# Patient Record
Sex: FEMALE | Race: WHITE | NOT HISPANIC OR LATINO | Employment: UNEMPLOYED | URBAN - METROPOLITAN AREA
[De-identification: names, ages, dates, MRNs, and addresses within clinical notes are randomized per-mention and may not be internally consistent; named-entity substitution may affect disease eponyms.]

---

## 2018-03-12 ENCOUNTER — CONVERSION ENCOUNTER (OUTPATIENT)
Dept: SURGERY | Facility: CLINIC | Age: 68
End: 2018-03-12

## 2018-03-12 ENCOUNTER — OFFICE VISIT CONVERTED (OUTPATIENT)
Dept: SURGERY | Facility: CLINIC | Age: 68
End: 2018-03-12
Attending: UROLOGY

## 2018-03-26 ENCOUNTER — OFFICE VISIT CONVERTED (OUTPATIENT)
Dept: SURGERY | Facility: CLINIC | Age: 68
End: 2018-03-26
Attending: UROLOGY

## 2018-04-13 ENCOUNTER — OFFICE VISIT CONVERTED (OUTPATIENT)
Dept: SURGERY | Facility: CLINIC | Age: 68
End: 2018-04-13
Attending: UROLOGY

## 2018-05-25 ENCOUNTER — OFFICE VISIT CONVERTED (OUTPATIENT)
Dept: SURGERY | Facility: CLINIC | Age: 68
End: 2018-05-25
Attending: UROLOGY

## 2018-05-25 ENCOUNTER — CONVERSION ENCOUNTER (OUTPATIENT)
Dept: SURGERY | Facility: CLINIC | Age: 68
End: 2018-05-25

## 2018-12-04 ENCOUNTER — OFFICE VISIT CONVERTED (OUTPATIENT)
Dept: SURGERY | Facility: CLINIC | Age: 68
End: 2018-12-04
Attending: UROLOGY

## 2020-01-07 ENCOUNTER — HOSPITAL ENCOUNTER (OUTPATIENT)
Dept: CT IMAGING | Facility: HOSPITAL | Age: 70
Discharge: HOME | End: 2020-01-07

## 2020-01-07 LAB
CREAT BLD-MCNC: 1.1 MG/DL (ref 0.6–1.4)
GFR SERPLBLD BASED ON 1.73 SQ M-ARVRAT: 51 ML/MIN/{1.73_M2}

## 2020-01-31 ENCOUNTER — OFFICE VISIT CONVERTED (OUTPATIENT)
Dept: UROLOGY | Facility: CLINIC | Age: 70
End: 2020-01-31
Attending: SURGERY

## 2020-01-31 ENCOUNTER — CONVERSION ENCOUNTER (OUTPATIENT)
Dept: SURGERY | Facility: CLINIC | Age: 70
End: 2020-01-31

## 2021-03-04 ENCOUNTER — HOSPITAL ENCOUNTER (OUTPATIENT)
Dept: CT IMAGING | Facility: HOSPITAL | Age: 71
Discharge: HOME | End: 2021-03-04
Attending: UROLOGY

## 2021-03-04 LAB
CREAT BLD-MCNC: 1.3 MG/DL (ref 0.6–1.4)
GFR SERPLBLD BASED ON 1.73 SQ M-ARVRAT: 41 ML/MIN/{1.73_M2}

## 2021-04-12 ENCOUNTER — OFFICE VISIT CONVERTED (OUTPATIENT)
Dept: UROLOGY | Facility: CLINIC | Age: 71
End: 2021-04-12
Attending: UROLOGY

## 2021-04-12 ENCOUNTER — HOSPITAL ENCOUNTER (OUTPATIENT)
Dept: SURGERY | Facility: CLINIC | Age: 71
Discharge: HOME | End: 2021-04-12
Attending: UROLOGY

## 2021-04-12 ENCOUNTER — CONVERSION ENCOUNTER (OUTPATIENT)
Dept: SURGERY | Facility: CLINIC | Age: 71
End: 2021-04-12

## 2021-04-12 LAB
BILIRUB UR QL STRIP: NEGATIVE
COLOR UR: YELLOW
CONV CLARITY OF URINE: CLEAR
CONV PROTEIN IN URINE BY AUTOMATED TEST STRIP: NORMAL
CONV UROBILINOGEN IN URINE BY AUTOMATED TEST STRIP: NORMAL
GLUCOSE UR QL: NEGATIVE
HGB UR QL STRIP: NORMAL
KETONES UR QL STRIP: NEGATIVE
LEUKOCYTE ESTERASE UR QL STRIP: NORMAL
NITRITE UR QL STRIP: POSITIVE
PH UR STRIP.AUTO: 5.5 [PH]
SP GR UR: 1.03

## 2021-04-15 LAB
AMPICILLIN SUSC ISLT: <=2
AMPICILLIN+SULBAC SUSC ISLT: <=2
BACTERIA UR CULT: ABNORMAL
CEFAZOLIN SUSC ISLT: <=4
CEFEPIME SUSC ISLT: <=0.12
CEFTAZIDIME SUSC ISLT: <=1
CEFTRIAXONE SUSC ISLT: <=0.25
CIPROFLOXACIN SUSC ISLT: <=0.25
ERTAPENEM SUSC ISLT: <=0.12
GENTAMICIN SUSC ISLT: <=1
LEVOFLOXACIN SUSC ISLT: <=0.12
NITROFURANTOIN SUSC ISLT: 32
PIP+TAZO SUSC ISLT: <=4
TMP SMX SUSC ISLT: <=20
TOBRAMYCIN SUSC ISLT: <=1

## 2021-05-14 VITALS — BODY MASS INDEX: 32.39 KG/M2 | HEIGHT: 62 IN | WEIGHT: 176 LBS | RESPIRATION RATE: 16 BRPM

## 2021-05-14 NOTE — PROGRESS NOTES
Progress Note      Patient Name: Treva Chan   Patient ID: [id number]   Sex: Female   YOB: 1950    Primary Care Provider: Joe PALACIOS   Referring Provider: Robbie Contreras MD    Visit Date: April 12, 2021    Provider: Robibe Contreras MD   Location: [facility]   Location Address: [address] [place] [address]  [id number]   Location Phone: [phone]          Chief Complaint  · urological issues      History Of Present Illness     69 yo female presents in follow up after having robot assist lap right partial nx in May 2018.    3/4/2021 CT abdomen/pelvis with -negative for metastatic disease.  Negative for recurrence.  Moderate hiatal hernia.  Previous ventral hernia repair    4/21 creatinine 1.2, GFR 44  4/21 chest x-ray - neg    Doing well. No changes to her medical history.    Patient does deal with some frequency and urgency but this has been her baseline for many years.  No burning/dysuria currently, she is nitrite positive    NO GH/UTI    does have a right lateral Spigelian hernia has seen Gen surg.    Previous    1/20 CT abdomen/pelvis with and withoutno signs of metastatic or recurrent disease.  Previous partial nephrectomy site on the right kidney.    1/20 Cr  1.1, 51 GFR  1/18  CXR  neg    5/9/2018 lap right partial nephrectomy  Path:   clear cell RCC  G2 1.9 cm  margins negative  pT1a         Past Medical History  Anxiety; Fluttering heart; Hyperlipemia; Hypertension; Renal cell cancer, right; Stroke         Past Surgical History  Cholecstectomy; Hernia; Robotic-assisted partial right nephrectomy         Medication List  acetaminophen 500 mg oral tablet; amlodipine-valsartan-hcthiazid 5-160-12.5 mg oral tablet; Aspir-81 81 mg oral tablet,delayed release (DR/EC); atorvastatin 40 mg oral tablet; cetirizine 10 mg oral tablet; colestipol 1 gram oral tablet; lisinopril 20 mg oral tablet; meclizine 25 mg oral tablet; metoprolol succinate 50 mg oral  "tablet extended release 24 hr; Myrbetriq 50 mg oral tablet extended release 24 hr; ranitidine HCl 300 mg oral capsule; sertraline 50 mg oral tablet         Allergy List  Latex Exam Gloves; Levaquin; Seasonal allergies       Allergies Reconciled  Family Medical History  Lung cancer; Prostate cancer         Social History  Tobacco (Former)         Review of Systems  · Constitutional  o Denies  o : fatigue, fever, chills  · HENT  o Admits  o : vertigo, lightheadedness  o Denies  o : headaches  · Cardiovascular  o Denies  o : chest pain  · Respiratory  o Denies  o : shortness of breath  · Gastrointestinal  o Denies  o : nausea, vomiting, diarrhea      Vitals  Date Time BP Position Site L\R Cuff Size HR RR TEMP (F) WT  HT  BMI kg/m2 BSA m2 O2 Sat FR L/min FiO2 HC       04/12/2021 12:54 PM       16  175lbs 16oz 5'  2\" 32.19 1.87             Physical Examination  · Constitutional  o Appearance  o : Well-appearing, well-developed, in no acute distress  · Respiratory  o Respiratory Effort  o : Unlabored breathing  · Neurologic  o Mental Status Examination  o :   § Orientation  § : Grossly oriented to person, place and time, judgment and insight intact, normal mood and affect          Results  · In-Office Procedures  o Lab procedure  § IOP - Urinalysis without Microscopy (Clinitek) [facility] ([id number])   § Color Ur: Yellow   § Clarity Ur: Clear   § Glucose Ur Ql Strip: Negative   § Bilirub Ur Ql Strip: Negative   § Ketones Ur Ql Strip: Negative   § Sp Gr Ur Qn: 1.030   § Hgb Ur Ql Strip: Trace-Intact   § pH Ur-LsCnc: 5.5   § Prot Ur Ql Strip: Trace   § Urobilinogen Ur Strip-mCnc: 0.2 E.U./dL   § Nitrite Ur Ql Strip: Positive   § WBC Est Ur Ql Strip: Trace       Assessment  · Renal cell carcinoma of right kidney     189.0/C64.1  · UTI (urinary tract infection)     599.0/N39.0      Plan  · Orders  o Chest (AP PA and Lateral) 2 views X-Ray [facility] ([id number]) - 189.0/C64.1 - 04/12/2022  o CMP Non-fasting [facility] ([id number]) - 189.0/C64.1 " - 04/12/2022  o Urine Culture (Clean Catch) [facility] ([id number]) - 599.0/N39.0 - 04/12/2021  · Medications  o Medications have been Reconciled  o Transition of Care or Provider Policy  · Instructions  o Electronically Identified Patient Education Materials Provided Electronically         Urine culture today    Reviewed CT chest x-ray and labs, patient given reassurance      Per NCCN guidelines follow-up in 1 year with chest x-ray and CMP.  Patient needs this x1 more year after that and if negative neds no further follow-up.  No further abdominal imaging.             Electronically Signed by: Robbie Contreras MD -Author on April 12, 2021 04:42:03 PM

## 2021-05-15 VITALS — BODY MASS INDEX: 30.78 KG/M2 | RESPIRATION RATE: 12 BRPM | WEIGHT: 167.25 LBS | HEIGHT: 62 IN

## 2021-05-15 VITALS — WEIGHT: 165 LBS | BODY MASS INDEX: 30.36 KG/M2 | HEIGHT: 62 IN | RESPIRATION RATE: 16 BRPM

## 2021-05-15 VITALS — HEIGHT: 62 IN | RESPIRATION RATE: 17 BRPM | BODY MASS INDEX: 30.36 KG/M2 | WEIGHT: 165 LBS

## 2021-05-16 VITALS — BODY MASS INDEX: 30 KG/M2 | HEIGHT: 62 IN | RESPIRATION RATE: 16 BRPM | WEIGHT: 163 LBS

## 2021-05-16 VITALS — BODY MASS INDEX: 30.91 KG/M2 | RESPIRATION RATE: 16 BRPM | WEIGHT: 168 LBS | HEIGHT: 62 IN

## 2021-05-16 VITALS — HEIGHT: 62 IN | WEIGHT: 165 LBS | OXYGEN SATURATION: 99 % | HEART RATE: 91 BPM | BODY MASS INDEX: 30.36 KG/M2

## 2021-05-16 VITALS — HEIGHT: 62 IN | RESPIRATION RATE: 16 BRPM | WEIGHT: 168.19 LBS | BODY MASS INDEX: 30.95 KG/M2

## 2022-04-15 PROBLEM — Z85.528 HISTORY OF RENAL CELL CARCINOMA: Status: ACTIVE | Noted: 2022-04-15

## 2022-04-15 NOTE — PROGRESS NOTES
Chief Complaint    Urologic complaint    Subjective          Treva Chan presents to [facility] UROLOGY  History of Present Illness     70 yo female     Robot assist lap right partial nx in May 2018.    3/4/2021 CT abdomen/pelvis with -negative for metastatic disease.  Negative for recurrence.  Moderate hiatal hernia.  Previous ventral hernia repair     patient voiding okay.  UTI every 2 to 3 months    She is having trouble with urge incontinence.  She is wearing.  1 pad daily.    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    Previous    4/21 creatinine 1.2, GFR 44  4/21 chest x-ray - neg    1/20 CT abdomen/pelvis with and withoutno signs of metastatic or recurrent disease.  Previous partial nephrectomy site on the right kidney.    1/20 Cr  1.1, 51 GFR  1/18  CXR  neg    5/9/2018 lap right partial nephrectomy  Path:   clear cell RCC  G2 1.9 cm  margins negative  pT1a       Past History:  Medical History: has no past medical history on file.   Surgical History: has no past surgical history on file.   Family History: family history is not on file.   Social History:   Allergies: Patient has no allergy information on record.     No current outpatient medications on file.     Physical exam       Alert and orient x3  Well appearing, well developed, in no acute distress   Unlabored respirations      Grossly oriented to person, place and time, judgment is intact, normal mood and affect    Results for orders placed or performed in visit on 04/12/21   Urinalysis without microscopic (no culture) -   Result Value Ref Range    Leukocytes, UA Trace     Nitrite, UA Positive     Urobilinogen, UA 0.2 E.U./dL     Protein, UA Trace     pH, UA 5.5     Blood, UA Trace-Intact     Specific [place], UA 1.030     Ketones, UA Negative     Bilirubin, UA Negative     Glucose, UA Negative     Appearance Clear     Color, UA Yellow         Objective     Vital Signs:   There were no vitals taken for this  visit.             Assessment and Plan    Diagnoses and all orders for this visit:    1. History of renal cell carcinoma (Primary)      Reviewed CT       Has not yet done her chest x-ray and CMP    Chest x-ray and CMP now  Per NCCN guidelines follow-up in 1 year with chest x-ray and CMP.      If this is negative no further follow-up needed on her renal cell carcinoma    Urge incontinence    Patient is bothered after discussion we will try her on Myrbetriq 25 mg daily.  1 month samples given today.  Risk and benefits discussed.  She will call in if she wants a prescription    Follow-up with nurse practitioner in 1 year

## 2022-04-18 ENCOUNTER — OFFICE VISIT (OUTPATIENT)
Dept: UROLOGY | Facility: CLINIC | Age: 72
End: 2022-04-18

## 2022-04-18 VITALS — WEIGHT: 175 LBS | BODY MASS INDEX: 32.2 KG/M2 | RESPIRATION RATE: 19 BRPM | HEIGHT: 62 IN

## 2022-04-18 DIAGNOSIS — N39.41 URGE INCONTINENCE: ICD-10-CM

## 2022-04-18 DIAGNOSIS — Z85.528 HISTORY OF RENAL CELL CARCINOMA: Primary | ICD-10-CM

## 2022-04-18 PROCEDURE — 99214 OFFICE O/P EST MOD 30 MIN: CPT | Performed by: UROLOGY

## 2022-04-18 RX ORDER — AMLODIPINE AND VALSARTAN 5; 160 MG/1; MG/1
TABLET ORAL
COMMUNITY

## 2022-04-18 RX ORDER — HYDROCHLOROTHIAZIDE 12.5 MG/1
CAPSULE, GELATIN COATED ORAL
COMMUNITY

## 2022-04-18 RX ORDER — METOPROLOL SUCCINATE 100 MG/1
TABLET, EXTENDED RELEASE ORAL
COMMUNITY

## 2022-04-18 RX ORDER — FAMOTIDINE 40 MG/1
TABLET, FILM COATED ORAL
COMMUNITY

## 2022-04-18 RX ORDER — ASPIRIN 81 MG/1
TABLET ORAL
COMMUNITY

## 2022-04-18 RX ORDER — ATORVASTATIN CALCIUM 10 MG/1
TABLET, FILM COATED ORAL
COMMUNITY

## 2022-04-21 DIAGNOSIS — Z85.528 HISTORY OF RENAL CELL CARCINOMA: ICD-10-CM

## 2023-04-12 ENCOUNTER — TELEPHONE (OUTPATIENT)
Dept: UROLOGY | Facility: CLINIC | Age: 73
End: 2023-04-12
Payer: MEDICARE

## 2023-04-12 DIAGNOSIS — Z85.528 HISTORY OF RENAL CELL CARCINOMA: Primary | ICD-10-CM

## 2023-04-12 NOTE — TELEPHONE ENCOUNTER
Pt needs to have CMP and Chest Xray completed before her appt on 4/20/23. I attempted to call patient but no answer and no voicemail box set up.

## 2023-05-04 ENCOUNTER — TELEPHONE (OUTPATIENT)
Dept: UROLOGY | Facility: CLINIC | Age: 73
End: 2023-05-04
Payer: MEDICARE

## 2023-05-04 NOTE — TELEPHONE ENCOUNTER
Caller: GIN ABRAMS        Best call back number: [phone]    Patient is needing: PLEASE FAX LAB ORDER TO [facility] [phone]    PLEASE FAX IAING ORDER -[phone]    PLEASE CALL PT ONCE COMPLETED.  TKS

## 2023-05-04 NOTE — TELEPHONE ENCOUNTER
Faxed orders to [facility]. Attempted to call pt but no answer and no voicemail box set up.

## 2023-05-30 ENCOUNTER — OFFICE VISIT (OUTPATIENT)
Dept: UROLOGY | Facility: CLINIC | Age: 73
End: 2023-05-30

## 2023-05-30 VITALS — WEIGHT: 165 LBS | HEIGHT: 62 IN | RESPIRATION RATE: 12 BRPM | BODY MASS INDEX: 30.36 KG/M2

## 2023-05-30 DIAGNOSIS — Z85.528 HISTORY OF RENAL CELL CARCINOMA: Primary | ICD-10-CM

## 2023-05-30 DIAGNOSIS — N39.0 URINARY TRACT INFECTION WITHOUT HEMATURIA, SITE UNSPECIFIED: ICD-10-CM

## 2023-05-30 DIAGNOSIS — N39.41 URGE INCONTINENCE: ICD-10-CM

## 2023-05-30 PROBLEM — E78.5 HYPERLIPIDEMIA: Status: ACTIVE | Noted: 2023-05-30

## 2023-05-30 PROBLEM — K86.2 CYST OF PANCREAS: Status: ACTIVE | Noted: 2020-01-07

## 2023-05-30 PROBLEM — K57.30 DIVERTICULOSIS OF SIGMOID COLON: Status: ACTIVE | Noted: 2018-03-09

## 2023-05-30 PROBLEM — I63.9 STROKE: Status: ACTIVE | Noted: 2017-07-19

## 2023-05-30 PROBLEM — I35.1 AORTIC INSUFFICIENCY: Status: ACTIVE | Noted: 2023-05-30

## 2023-05-30 PROBLEM — IMO0002 DEGENERATION OF INTERVERTEBRAL DISC: Status: ACTIVE | Noted: 2017-07-19

## 2023-05-30 PROBLEM — I10 ESSENTIAL HYPERTENSION: Status: ACTIVE | Noted: 2017-07-19

## 2023-05-30 PROBLEM — I49.3 PVC'S (PREMATURE VENTRICULAR CONTRACTIONS): Status: ACTIVE | Noted: 2023-05-30

## 2023-05-30 PROBLEM — L57.0 ACTINIC KERATOSIS: Status: ACTIVE | Noted: 2020-05-13

## 2023-05-30 PROBLEM — K44.9 HIATAL HERNIA: Status: ACTIVE | Noted: 2018-03-09

## 2023-05-30 PROBLEM — M19.90 OSTEOARTHRITIS: Status: ACTIVE | Noted: 2017-07-19

## 2023-05-30 PROBLEM — R19.7 POSTCHOLECYSTECTOMY DIARRHEA: Status: ACTIVE | Noted: 2018-10-12

## 2023-05-30 PROBLEM — F41.9 ANXIETY: Status: ACTIVE | Noted: 2023-05-30

## 2023-05-30 PROBLEM — I65.29 CAROTID ARTERY STENOSIS: Status: ACTIVE | Noted: 2019-04-24

## 2023-05-30 PROBLEM — E78.5 DYSLIPIDEMIA: Status: ACTIVE | Noted: 2017-07-19

## 2023-05-30 PROBLEM — J30.9 ALLERGIC RHINITIS: Status: ACTIVE | Noted: 2018-06-27

## 2023-05-30 PROBLEM — L40.9 PSORIASIS: Status: ACTIVE | Noted: 2017-07-19

## 2023-05-30 PROBLEM — C64.9 RENAL CELL CARCINOMA: Status: ACTIVE | Noted: 2021-07-13

## 2023-05-30 PROBLEM — R00.2 PALPITATIONS: Status: ACTIVE | Noted: 2017-03-15

## 2023-05-30 PROBLEM — G93.0 ARACHNOID CYST: Status: ACTIVE | Noted: 2019-04-24

## 2023-05-30 PROBLEM — I77.810 ASCENDING AORTA DILATATION: Status: ACTIVE | Noted: 2023-05-30

## 2023-05-30 PROBLEM — Z87.442 HISTORY OF RENAL CALCULI: Status: ACTIVE | Noted: 2018-11-27

## 2023-05-30 PROBLEM — K91.89 POSTCHOLECYSTECTOMY DIARRHEA: Status: ACTIVE | Noted: 2018-10-12

## 2023-05-30 PROBLEM — I71.9 AORTIC ANEURYSM: Status: ACTIVE | Noted: 2018-06-27

## 2023-05-30 LAB
BILIRUB BLD-MCNC: NEGATIVE MG/DL
CLARITY, POC: CLEAR
COLOR UR: YELLOW
EXPIRATION DATE: ABNORMAL
GLUCOSE UR STRIP-MCNC: NEGATIVE MG/DL
KETONES UR QL: NEGATIVE
LEUKOCYTE EST, POC: ABNORMAL
Lab: ABNORMAL
NITRITE UR-MCNC: POSITIVE MG/ML
PH UR: 5 [PH] (ref 5–8)
PROT UR STRIP-MCNC: NEGATIVE MG/DL
RBC # UR STRIP: ABNORMAL /UL
SP GR UR: 1.02 (ref 1–1.03)
URINE VOLUME: 0
UROBILINOGEN UR QL: NORMAL

## 2023-05-30 PROCEDURE — 87086 URINE CULTURE/COLONY COUNT: CPT | Performed by: NURSE PRACTITIONER

## 2023-05-30 PROCEDURE — 87186 SC STD MICRODIL/AGAR DIL: CPT | Performed by: NURSE PRACTITIONER

## 2023-05-30 PROCEDURE — 87077 CULTURE AEROBIC IDENTIFY: CPT | Performed by: NURSE PRACTITIONER

## 2023-05-30 RX ORDER — MECLIZINE HYDROCHLORIDE 25 MG/1
TABLET ORAL
COMMUNITY
Start: 2023-03-20

## 2023-05-30 RX ORDER — OXYBUTYNIN CHLORIDE 15 MG/1
TABLET, EXTENDED RELEASE ORAL
COMMUNITY
Start: 2023-05-18

## 2023-05-30 RX ORDER — ACETAMINOPHEN 500 MG
TABLET ORAL EVERY 8 HOURS SCHEDULED
COMMUNITY
Start: 2023-02-07

## 2023-05-30 NOTE — PROGRESS NOTES
Chief Complaint: Follow-up (PT HERE FOR FOLLOW.  )    Subjective         History of Present Illness  Treva Chan is a 73 y.o. female presents to [facility] to be seen for annual follow-up.    Robot assist lap right partial nx in May 2018.    Given myrbetriq samples at last visit related to incontinence.      She is on oxybutynin from her PCP. She states this helps some but she is still having urgency and frequency.     She states a foul odor to urine.     Recently been dealing with UTIs.    Admitted to the hospital in 2/2023 for UTI.    She is drinking a cup of coffee 1-2 cokes a day and very little water.     She is having trouble with urge incontinence.  She is wearing 2 pads daily.    5/2023 creatinine 1.1 GFR 49 does not see nephrology     Patient states she had a chest x-ray however we do not have record of this for today's visit.    Previous:   does have a right lateral Spigelian hernia has seen Gen surg.     4/21 creatinine 1.2, GFR 44  4/21 chest x-ray - neg    1/20 CT abdomen/pelvis with and without no signs of metastatic or recurrent disease.  Previous partial nephrectomy site on the right kidney.    1/20 Cr  1.1, 51 GFR  1/18  CXR  neg    5/9/2018 lap right partial nephrectomy  Path:   clear cell RCC  G2 1.9 cm  margins negative  pT1a    Objective     Past Medical History:   Diagnosis Date   • Hypertension        History reviewed. No pertinent surgical history.      Current Outpatient Medications:   •  acetaminophen (TYLENOL) 500 MG tablet, Every 8 (Eight) Hours., Disp: , Rfl:   •  amLODIPine-valsartan (EXFORGE) 5-160 MG per tablet, amlodipine 5 mg-valsartan 160 mg tablet  TAKE ONE TABLET BY MOUTH EVERY DAY (TAKE WITH HCTZ), Disp: , Rfl:   •  aspirin 81 MG EC tablet, Aspir-81 81 mg oral tablet,delayed release (DR/EC) take 1 tablet (81 mg) by oral route once daily   Active, Disp: , Rfl:   •  atorvastatin (LIPITOR) 10 MG tablet, atorvastatin 10 mg tablet  TAKE ONE TABLET BY  "MOUTH EVERY DAY, Disp: , Rfl:   •  famotidine (PEPCID) 40 MG tablet, famotidine 40 mg tablet  TAKE 1 TABLET BY MOUTH ONCE DAILY, Disp: , Rfl:   •  meclizine (ANTIVERT) 25 MG tablet, TAKE ONE TABLET BY MOUTH TWO TIMES PER DAY AS NEEDED, Disp: , Rfl:   •  metoprolol succinate XL (TOPROL-XL) 100 MG 24 hr tablet, metoprolol succinate  mg tablet,extended release 24 hr  TAKE ONE TABLET BY MOUTH EVERY DAY, Disp: , Rfl:   •  oxybutynin XL (DITROPAN XL) 15 MG 24 hr tablet, TAKE ONE TABLET BY MOUTH EVERY DAY : INCREASE IN DOSE, Disp: , Rfl:   •  sertraline (ZOLOFT) 50 MG tablet, sertraline 50 mg tablet  TAKE ONE TABLET BY MOUTH EVERY DAY, Disp: , Rfl:     Allergies   Allergen Reactions   • Levofloxacin Paresthesia   • Latex Rash        History reviewed. No pertinent family history.    Social History     Socioeconomic History   • Marital status:    Tobacco Use   • Smoking status: Never   • Smokeless tobacco: Never   Vaping Use   • Vaping Use: Never used   Substance and Sexual Activity   • Alcohol use: Never   • Drug use: Never   • Sexual activity: Defer       Vital Signs:   Resp 12   Ht 157.5 cm (62\")   Wt 74.8 kg (165 lb)   BMI 30.18 kg/m²      Physical Exam     Result Review :   The following data was reviewed by: SUZANNE Garber on 05/30/2023:  Results for orders placed or performed in visit on 05/30/23   Bladder Scan   Result Value Ref Range    Urine Volume 0    POC Urinalysis Dipstick, Automated    Specimen: Urine   Result Value Ref Range    Color Yellow Yellow, Straw, Dark Yellow, Jolie    Clarity, UA Clear Clear    Specific Gravity  1.025 1.005 - 1.030    pH, Urine 5.0 5.0 - 8.0    Leukocytes Small (1+) (A) Negative    Nitrite, UA Positive (A) Negative    Protein, POC Negative Negative mg/dL    Glucose, UA Negative Negative mg/dL    Ketones, UA Negative Negative    Urobilinogen, UA Normal Normal, 0.2 E.U./dL    Bilirubin Negative Negative    Blood, UA Small (A) Negative    Lot Number [id number]     " Expiration Date 2,024/7         Bladder Scan interpretation 05/30/2023    Estimation of residual urine via BVI 3000 Verathon Bladder Scan  MA/nurse performing: Mik STOLL RN   Residual Urine: 0 ml  Indication: History of renal cell carcinoma    Urge incontinence    Urinary tract infection without hematuria, site unspecified   Position: Supine  Examination: Incremental scanning of the suprapubic area using 2.0 MHz transducer using copious amounts of acoustic gel.   Findings: An anechoic area was demonstrated which represented the bladder, with measurement of residual urine as noted. I inspected this myself. In that the residual urine was stable or insignificant, refer to plan for treatment and plan necessary at this time.         Procedures        Assessment and Plan    Diagnoses and all orders for this visit:    1. History of renal cell carcinoma (Primary)  -     POC Urinalysis Dipstick, Automated  -     Bladder Scan    2. Urge incontinence  -     POC Urinalysis Dipstick, Automated  -     Bladder Scan    3. Urinary tract infection without hematuria, site unspecified  -     Urine Culture - Urine, Urine, Clean Catch; Future      Discussed with patient at this point in time unsure of renal cell carcinoma recurrence.  Will obtain chest x-ray and call with results.    Did discuss with patient her kidney function is not optimal for solitary kidney.  Also discussed with patient that her chronic dehydration could be contributing to her decreased renal function as well as her continued issues with urinary urgency and frequency as concentrated urine is very irritating to the bladder and will inherently cause more urgency frequency and that increasing her oral hydration will dilute her urine and help with urgency and frequency and can help with incontinence.          I spent 10 minutes caring for Treva on this date of service. This time includes time spent by me in the following activities:reviewing tests, obtaining and/or  reviewing a separately obtained history, performing a medically appropriate examination and/or evaluation , counseling and educating the patient/family/caregiver, ordering medications, tests, or procedures, and documenting information in the medical record  Follow Up   Return in about 3 months (around 8/30/2023) for f/u Uti/ incontinence.  Patient was given instructions and counseling regarding her condition or for health maintenance advice. Please see specific information pulled into the AVS if appropriate.         This document has been electronically signed by SUZANNE Garber  May 30, 2023 09:55 EDT

## 2023-06-01 ENCOUNTER — TELEPHONE (OUTPATIENT)
Dept: UROLOGY | Facility: CLINIC | Age: 73
End: 2023-06-01

## 2023-06-01 LAB — BACTERIA SPEC AEROBE CULT: ABNORMAL

## 2023-06-01 RX ORDER — SULFAMETHOXAZOLE AND TRIMETHOPRIM 800; 160 MG/1; MG/1
1 TABLET ORAL 2 TIMES DAILY
Qty: 20 TABLET | Refills: 0 | Status: SHIPPED | OUTPATIENT
Start: 2023-06-01 | End: 2023-06-11

## 2023-06-01 NOTE — PROGRESS NOTES
Please inform patient her urine culture is positive and I have sent in medication to her pharmacy to treat her infection.

## 2023-06-01 NOTE — TELEPHONE ENCOUNTER
----- Message from SUZANNE Garber sent at 6/1/2023  9:22 AM EDT -----  We also got her x-ray results back which show no recurrence of her cancer.

## 2023-06-06 DIAGNOSIS — Z85.528 HISTORY OF RENAL CELL CARCINOMA: ICD-10-CM

## 2023-08-15 ENCOUNTER — LAB (OUTPATIENT)
Dept: ONCOLOGY | Facility: HOSPITAL | Age: 73
End: 2023-08-15
Payer: MEDICARE

## 2023-08-15 ENCOUNTER — OFFICE VISIT (OUTPATIENT)
Dept: UROLOGY | Facility: CLINIC | Age: 73
End: 2023-08-15
Payer: MEDICARE

## 2023-08-15 ENCOUNTER — CONSULT (OUTPATIENT)
Dept: ONCOLOGY | Facility: HOSPITAL | Age: 73
End: 2023-08-15
Payer: MEDICARE

## 2023-08-15 VITALS
WEIGHT: 160.5 LBS | OXYGEN SATURATION: 95 % | TEMPERATURE: 97.9 F | DIASTOLIC BLOOD PRESSURE: 59 MMHG | BODY MASS INDEX: 29.53 KG/M2 | SYSTOLIC BLOOD PRESSURE: 102 MMHG | HEIGHT: 62 IN | HEART RATE: 62 BPM | RESPIRATION RATE: 18 BRPM

## 2023-08-15 VITALS — BODY MASS INDEX: 29.3 KG/M2 | RESPIRATION RATE: 14 BRPM | HEIGHT: 62 IN | WEIGHT: 159.2 LBS

## 2023-08-15 DIAGNOSIS — D64.9 ANEMIA, UNSPECIFIED TYPE: Primary | ICD-10-CM

## 2023-08-15 DIAGNOSIS — D72.820 LYMPHOCYTOSIS: Primary | ICD-10-CM

## 2023-08-15 DIAGNOSIS — N39.41 URGE INCONTINENCE: ICD-10-CM

## 2023-08-15 DIAGNOSIS — Z85.528 HISTORY OF RENAL CELL CARCINOMA: Primary | ICD-10-CM

## 2023-08-15 DIAGNOSIS — N39.0 URINARY TRACT INFECTION WITHOUT HEMATURIA, SITE UNSPECIFIED: ICD-10-CM

## 2023-08-15 DIAGNOSIS — D72.829 LEUKOCYTOSIS, UNSPECIFIED TYPE: ICD-10-CM

## 2023-08-15 LAB
ALBUMIN SERPL-MCNC: 4.4 G/DL (ref 3.5–5.2)
ALBUMIN/GLOB SERPL: 1.8 G/DL
ALP SERPL-CCNC: 68 U/L (ref 39–117)
ALT SERPL W P-5'-P-CCNC: 14 U/L (ref 1–33)
ANION GAP SERPL CALCULATED.3IONS-SCNC: 11.7 MMOL/L (ref 5–15)
ANISOCYTOSIS BLD QL: ABNORMAL
AST SERPL-CCNC: 27 U/L (ref 1–32)
BILIRUB BLD-MCNC: NEGATIVE MG/DL
BILIRUB SERPL-MCNC: 0.4 MG/DL (ref 0–1.2)
BUN SERPL-MCNC: 20 MG/DL (ref 8–23)
BUN/CREAT SERPL: 11.8 (ref 7–25)
CALCIUM SPEC-SCNC: 9 MG/DL (ref 8.6–10.5)
CHLORIDE SERPL-SCNC: 104 MMOL/L (ref 98–107)
CLARITY, POC: CLEAR
CO2 SERPL-SCNC: 27.3 MMOL/L (ref 22–29)
COLOR UR: YELLOW
CREAT SERPL-MCNC: 1.69 MG/DL (ref 0.57–1)
DEPRECATED RDW RBC AUTO: 43.5 FL (ref 37–54)
EGFRCR SERPLBLD CKD-EPI 2021: 31.6 ML/MIN/1.73
EOSINOPHIL # BLD MANUAL: 0.77 10*3/MM3 (ref 0–0.4)
EOSINOPHIL NFR BLD MANUAL: 6 % (ref 0.3–6.2)
ERYTHROCYTE [DISTWIDTH] IN BLOOD BY AUTOMATED COUNT: 13.2 % (ref 12.3–15.4)
EXPIRATION DATE: ABNORMAL
GLOBULIN UR ELPH-MCNC: 2.4 GM/DL
GLUCOSE SERPL-MCNC: 84 MG/DL (ref 65–99)
GLUCOSE UR STRIP-MCNC: NEGATIVE MG/DL
HCT VFR BLD AUTO: 39.1 % (ref 34–46.6)
HGB BLD-MCNC: 13 G/DL (ref 12–15.9)
KETONES UR QL: NEGATIVE
LARGE PLATELETS: ABNORMAL
LEUKOCYTE EST, POC: ABNORMAL
LYMPHOCYTES # BLD MANUAL: 8.57 10*3/MM3 (ref 0.7–3.1)
LYMPHOCYTES NFR BLD MANUAL: 4 % (ref 5–12)
Lab: ABNORMAL
MCH RBC QN AUTO: 29.7 PG (ref 26.6–33)
MCHC RBC AUTO-ENTMCNC: 33.2 G/DL (ref 31.5–35.7)
MCV RBC AUTO: 89.5 FL (ref 79–97)
MONOCYTES # BLD: 0.51 10*3/MM3 (ref 0.1–0.9)
NEUTROPHILS # BLD AUTO: 2.94 10*3/MM3 (ref 1.7–7)
NEUTROPHILS NFR BLD MANUAL: 23 % (ref 42.7–76)
NITRITE UR-MCNC: NEGATIVE MG/ML
PATHOLOGY REVIEW: YES
PH UR: 6 [PH] (ref 5–8)
PLATELET # BLD AUTO: 206 10*3/MM3 (ref 140–450)
PMV BLD AUTO: 10.9 FL (ref 6–12)
POIKILOCYTOSIS BLD QL SMEAR: ABNORMAL
POTASSIUM SERPL-SCNC: 4.4 MMOL/L (ref 3.5–5.2)
PROT SERPL-MCNC: 6.8 G/DL (ref 6–8.5)
PROT UR STRIP-MCNC: NEGATIVE MG/DL
RBC # BLD AUTO: 4.37 10*6/MM3 (ref 3.77–5.28)
RBC # UR STRIP: NEGATIVE /UL
SMALL PLATELETS BLD QL SMEAR: ADEQUATE
SODIUM SERPL-SCNC: 143 MMOL/L (ref 136–145)
SP GR UR: 1.02 (ref 1–1.03)
UROBILINOGEN UR QL: NORMAL
VARIANT LYMPHS NFR BLD MANUAL: 67 % (ref 19.6–45.3)
WBC MORPH BLD: NORMAL
WBC NRBC COR # BLD: 12.79 10*3/MM3 (ref 3.4–10.8)

## 2023-08-15 PROCEDURE — 99213 OFFICE O/P EST LOW 20 MIN: CPT | Performed by: NURSE PRACTITIONER

## 2023-08-15 PROCEDURE — 1159F MED LIST DOCD IN RCRD: CPT | Performed by: NURSE PRACTITIONER

## 2023-08-15 PROCEDURE — 80053 COMPREHEN METABOLIC PANEL: CPT | Performed by: NURSE PRACTITIONER

## 2023-08-15 PROCEDURE — 81003 URINALYSIS AUTO W/O SCOPE: CPT | Performed by: NURSE PRACTITIONER

## 2023-08-15 PROCEDURE — 1160F RVW MEDS BY RX/DR IN RCRD: CPT | Performed by: NURSE PRACTITIONER

## 2023-08-15 PROCEDURE — 87086 URINE CULTURE/COLONY COUNT: CPT | Performed by: NURSE PRACTITIONER

## 2023-08-15 PROCEDURE — 36415 COLL VENOUS BLD VENIPUNCTURE: CPT | Performed by: NURSE PRACTITIONER

## 2023-08-15 PROCEDURE — 85025 COMPLETE CBC W/AUTO DIFF WBC: CPT | Performed by: NURSE PRACTITIONER

## 2023-08-15 PROCEDURE — G0463 HOSPITAL OUTPT CLINIC VISIT: HCPCS | Performed by: NURSE PRACTITIONER

## 2023-08-15 RX ORDER — POLYETHYLENE GLYCOL-3350 AND ELECTROLYTES 236; 6.74; 5.86; 2.97; 22.74 G/274.31G; G/274.31G; G/274.31G; G/274.31G; G/274.31G
POWDER, FOR SOLUTION ORAL
COMMUNITY
Start: 2023-07-10 | End: 2023-08-15

## 2023-08-15 RX ORDER — OMEPRAZOLE 20 MG/1
1 CAPSULE, DELAYED RELEASE ORAL DAILY
COMMUNITY
Start: 2023-08-08

## 2023-08-15 RX ORDER — SULFAMETHOXAZOLE AND TRIMETHOPRIM 800; 160 MG/1; MG/1
1 TABLET ORAL EVERY 12 HOURS SCHEDULED
COMMUNITY
Start: 2023-06-30 | End: 2023-08-15

## 2023-08-15 NOTE — PROGRESS NOTES
Chief Complaint: Follow-up (Pt here for follow up.  Pt is having frequency.  Urine is dark color urine.)    Subjective         History of Present Illness  Treva Chan is a 73 y.o. female presents to [facility] UROLOGY to be seen for follow-up Renal function.    CMP performed on 7/26/2023 reveals BUN of 14 creatinine 1.0 GFR 54.    At last visit she was found to have a urinary tract infection.    The patient had complained that she had near constant urinary urgency frequency and dark-colored urine however she was drinking very little throughout the day and was only drinking caffeinated beverages.    Did recommend at last visit she increase her water intake to gain better control of her urinary symptoms.    She states that she was doing better after antibiotics but got worse again a month later.     She states she had leftover bactrim and took this for the last 2 days.     She states she feels as if she is with more urgency and frequency and feels as if she has a UTI every month.    She states her symptoms of a Uti are burning with urination and foul odor to the urine.    She has not increased her water intake at this time.    Previous:    Robot assist lap right partial nx in May 2018.    Given myrbetriq samples at last visit related to incontinence.      She is on oxybutynin from her PCP. She states this helps some but she is still having urgency and frequency.     She states a foul odor to urine.     Recently been dealing with UTIs.    Admitted to the hospital in 2/2023 for UTI.    She is drinking a cup of coffee 1-2 cokes a day and very little water.     She is having trouble with urge incontinence.  She is wearing 2 pads daily.    5/2023 creatinine 1.1 GFR 49 does not see nephrology     Patient states she had a chest x-ray however we do not have record of this for today's visit.     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     4/21 creatinine 1.2, GFR 44  4/21 chest  x-ray - neg    1/20 CT abdomen/pelvis with and without no signs of metastatic or recurrent disease.  Previous partial nephrectomy site on the right kidney.    1/20 Cr  1.1, 51 GFR  1/18  CXR  neg    5/9/2018 lap right partial nephrectomy  Path:   clear cell RCC  G2 1.9 cm  margins negative  pT1a    Objective     Past Medical History:   Diagnosis Date    Acid reflux     Aneurysm     Cancer     Coronary artery disease     Dizziness     High cholesterol     Hypertension     Incontinence     Urinary tract infection        Past Surgical History:   Procedure Laterality Date    COLONOSCOPY N/A     ENDOSCOPY N/A     GALLBLADDER SURGERY N/A     HERNIA REPAIR      NEPHRECTOMY PARTIAL Right          Current Outpatient Medications:     acetaminophen (TYLENOL) 500 MG tablet, Every 8 (Eight) Hours., Disp: , Rfl:     amLODIPine-valsartan (EXFORGE) 5-160 MG per tablet, amlodipine 5 mg-valsartan 160 mg tablet  TAKE ONE TABLET BY MOUTH EVERY DAY (TAKE WITH HCTZ), Disp: , Rfl:     aspirin 81 MG EC tablet, Aspir-81 81 mg oral tablet,delayed release (DR/EC) take 1 tablet (81 mg) by oral route once daily   Active, Disp: , Rfl:     atorvastatin (LIPITOR) 10 MG tablet, atorvastatin 10 mg tablet  TAKE ONE TABLET BY MOUTH EVERY DAY, Disp: , Rfl:     famotidine (PEPCID) 40 MG tablet, famotidine 40 mg tablet  TAKE 1 TABLET BY MOUTH ONCE DAILY, Disp: , Rfl:     meclizine (ANTIVERT) 25 MG tablet, TAKE ONE TABLET BY MOUTH TWO TIMES PER DAY AS NEEDED, Disp: , Rfl:     metoprolol succinate XL (TOPROL-XL) 100 MG 24 hr tablet, metoprolol succinate  mg tablet,extended release 24 hr  TAKE ONE TABLET BY MOUTH EVERY DAY, Disp: , Rfl:     omeprazole (priLOSEC) 20 MG capsule, Take 1 capsule by mouth Daily., Disp: , Rfl:     oxybutynin XL (DITROPAN XL) 15 MG 24 hr tablet, TAKE ONE TABLET BY MOUTH EVERY DAY : INCREASE IN DOSE, Disp: , Rfl:     Allergies   Allergen Reactions    Levofloxacin Paresthesia    Latex Rash        History reviewed. No pertinent  "family history.    Social History     Socioeconomic History    Marital status:    Tobacco Use    Smoking status: Former     Types: Cigarettes    Smokeless tobacco: Never   Vaping Use    Vaping Use: Never used   Substance and Sexual Activity    Alcohol use: Never    Drug use: Never    Sexual activity: Defer       Vital Signs:   Resp 14   Ht 157.5 cm (62\")   Wt 72.2 kg (159 lb 3.2 oz)   BMI 29.12 kg/mý      Physical Exam     Result Review :   The following data was reviewed by: SUZANNE Garber on 08/15/2023:  Results for orders placed or performed in visit on 08/15/23   POC Urinalysis Dipstick, Automated    Specimen: Urine   Result Value Ref Range    Color Yellow Yellow, Straw, Dark Yellow, Jolie    Clarity, UA Clear Clear    Specific Gravity  1.025 1.005 - 1.030    pH, Urine 6.0 5.0 - 8.0    Leukocytes Trace (A) Negative    Nitrite, UA Negative Negative    Protein, POC Negative Negative mg/dL    Glucose, UA Negative Negative mg/dL    Ketones, UA Negative Negative    Urobilinogen, UA Normal Normal, 0.2 E.U./dL    Bilirubin Negative Negative    Blood, UA Negative Negative    Lot Number [id number]     Expiration Date 2,024/8                 Procedures        Assessment and Plan    Diagnoses and all orders for this visit:    1. History of renal cell carcinoma (Primary)  -     POC Urinalysis Dipstick, Automated    2. Urge incontinence  -     POC Urinalysis Dipstick, Automated    3. Urinary tract infection without hematuria, site unspecified  -     POC Urinalysis Dipstick, Automated  -     Urine Culture - Urine, Urine, Clean Catch; Future        We will send urine for a culture today.     Did recommend that she increase her water intake to help with dark urine and odor as well as dysuria and urgency/ frequency.      I spent 10 minutes caring for Treva on this date of service. This time includes time spent by me in the following activities:reviewing tests, obtaining and/or reviewing a separately obtained " history, performing a medically appropriate examination and/or evaluation , counseling and educating the patient/family/caregiver, ordering medications, tests, or procedures, and documenting information in the medical record  Follow Up   Return in about 6 months (around 2/15/2024) for f/u UTis/ OAB .  Patient was given instructions and counseling regarding her condition or for health maintenance advice. Please see specific information pulled into the AVS if appropriate.         This document has been electronically signed by SUZANNE Garber  August 15, 2023 11:29 EDT

## 2023-08-15 NOTE — PROGRESS NOTES
Chief Complaint/Reason for Referral:  Elevated White blood cell count    Kimberly Jolly APRN Gilley, Misty G, SUZANNE    Records Obtained:  Records of the patients history including those obtained from  [facility] and patient information  were reviewed and summarized in detail.    Subjective    History of Present Illness    Ms. Treva Chan presents with her  for new patient evaluation for leukocytosis and lymphocytosis as referral from her PCP: SUZANNE Prince.     PMH includes: kidney stones, anxiety, hypertension, carotid artery stenosis, HLD, osteoarthritis, PVC's, renal cell carcinoma, and prior stroke. She is a former smoker. Renal cell carcinoma with robot assisted lap right partial in May of 2018. All of her scans have been cancer free. She follows with Dr. Robbie Contreras.     Has had chronic leukocytosis on 3 prior lab draws. Unsure of length of chronicitity of the leukocytosis, but she does have absolute lymphocytosis consistently above 5000. She does report a recent 5 pound weight loss. She does have chronic fatigue. She denies any early satiety. Denies any left upper quadrant pain.     7/26/2023:  WBC 10.67 absolute lymphocyte count of 6800, absolute eosinophils of 400.   6/28/2023: WBC 13.62, absolute lymphocytosis of 8300.   6/16/2023: WBC 13.35, absolute lymphocytosis of 9200.       Oncology/Hematology History    No history exists.       Review of Systems   Constitutional:  Positive for fatigue (5/10). Negative for appetite change, diaphoresis, fever, unexpected weight gain and unexpected weight loss.   HENT:  Negative for hearing loss, mouth sores, sore throat, swollen glands, trouble swallowing and voice change.    Eyes:  Negative for blurred vision.   Respiratory:  Negative for cough, shortness of breath and wheezing.    Cardiovascular:  Negative for chest pain and palpitations.   Gastrointestinal:  Negative for abdominal pain, blood in stool, constipation, diarrhea, nausea and vomiting.    Endocrine: Negative for cold intolerance and heat intolerance.   Genitourinary:  Negative for difficulty urinating, dysuria, frequency, hematuria and urinary incontinence.   Musculoskeletal:  Positive for arthralgias (5/10). Negative for back pain and myalgias.   Skin:  Negative for rash, skin lesions and wound.   Neurological:  Negative for dizziness, seizures, weakness, numbness and headache.   Hematological:  Does not bruise/bleed easily.   Psychiatric/Behavioral:  Negative for depressed mood. The patient is not nervous/anxious.    All other systems reviewed and are negative.    Current Outpatient Medications on File Prior to Visit   Medication Sig Dispense Refill    acetaminophen (TYLENOL) 500 MG tablet Every 8 (Eight) Hours.      amLODIPine-valsartan (EXFORGE) 5-160 MG per tablet amlodipine 5 mg-valsartan 160 mg tablet   TAKE ONE TABLET BY MOUTH EVERY DAY (TAKE WITH HCTZ)      aspirin 81 MG EC tablet Aspir-81 81 mg oral tablet,delayed release (DR/EC) take 1 tablet (81 mg) by oral route once daily   Active      atorvastatin (LIPITOR) 10 MG tablet atorvastatin 10 mg tablet   TAKE ONE TABLET BY MOUTH EVERY DAY      famotidine (PEPCID) 40 MG tablet famotidine 40 mg tablet   TAKE 1 TABLET BY MOUTH ONCE DAILY      meclizine (ANTIVERT) 25 MG tablet TAKE ONE TABLET BY MOUTH TWO TIMES PER DAY AS NEEDED      metoprolol succinate XL (TOPROL-XL) 100 MG 24 hr tablet metoprolol succinate  mg tablet,extended release 24 hr   TAKE ONE TABLET BY MOUTH EVERY DAY      omeprazole (priLOSEC) 20 MG capsule Take 1 capsule by mouth Daily.      oxybutynin XL (DITROPAN XL) 15 MG 24 hr tablet TAKE ONE TABLET BY MOUTH EVERY DAY : INCREASE IN DOSE      [DISCONTINUED] GaviLyte-G 236 g solution USE AS DIRECTED BY PRESCRIBER      [DISCONTINUED] sertraline (ZOLOFT) 50 MG tablet sertraline 50 mg tablet   TAKE ONE TABLET BY MOUTH EVERY DAY      [DISCONTINUED] sulfamethoxazole-trimethoprim (BACTRIM DS,SEPTRA DS) 800-160 MG per tablet Take  1 tablet by mouth Every 12 (Twelve) Hours.       No current facility-administered medications on file prior to visit.       Allergies   Allergen Reactions    Levofloxacin Paresthesia    Latex Rash     Past Medical History:   Diagnosis Date    Acid reflux     Aneurysm     Cancer     Coronary artery disease     Dizziness     High cholesterol     Hypertension     Incontinence     Urinary tract infection      Past Surgical History:   Procedure Laterality Date    COLONOSCOPY N/A     ENDOSCOPY N/A     GALLBLADDER SURGERY N/A     HERNIA REPAIR      NEPHRECTOMY PARTIAL Right      Social History     Socioeconomic History    Marital status:    Tobacco Use    Smoking status: Former     Types: Cigarettes    Smokeless tobacco: Never   Vaping Use    Vaping Use: Never used   Substance and Sexual Activity    Alcohol use: Never    Drug use: Never    Sexual activity: Defer     History reviewed. No pertinent family history.  Immunization History   Administered Date(s) Administered    COVID-19 (MODERNA) 1st,2nd,3rd Dose Monovalent 03/11/2021, 04/15/2021    COVID-19 (MODERNA) Monovalent Original Booster 12/14/2021    COVID-19 (UNSPECIFIED) 12/14/2021    Fluad Quad 65+ 01/19/2021, 12/07/2022    Influenza MDCK Quadrivalent with Preserative 10/12/2018    Pneumococcal Conjugate 13-Valent (PCV13) 02/28/2018    Pneumococcal Polysaccharide (PPSV23) 11/12/2014    flucelvax quad pfs =>4 YRS 10/12/2018       Tobacco Use: Medium Risk    Smoking Tobacco Use: Former    Smokeless Tobacco Use: Never    Passive Exposure: Not on file       Objective     Physical Exam  Vitals and nursing note reviewed.   Constitutional:       Appearance: Normal appearance.   HENT:      Head: Normocephalic.      Nose: Nose normal.      Mouth/Throat:      Mouth: Mucous membranes are moist.   Eyes:      Pupils: Pupils are equal, round, and reactive to light.   Cardiovascular:      Rate and Rhythm: Normal rate and regular rhythm.      Pulses: Normal pulses.      Heart  sounds: Normal heart sounds.   Pulmonary:      Effort: Pulmonary effort is normal. No respiratory distress.      Breath sounds: Normal breath sounds.   Abdominal:      General: Bowel sounds are normal. There is no distension.      Palpations: Abdomen is soft.      Tenderness: There is no abdominal tenderness.   Musculoskeletal:         General: Normal range of motion.      Cervical back: Normal range of motion and neck supple.   Skin:     General: Skin is warm and dry.      Capillary Refill: Capillary refill takes less than 2 seconds.   Neurological:      General: No focal deficit present.      Mental Status: She is alert and oriented to person, place, and time.   Psychiatric:         Mood and Affect: Mood normal.         Behavior: Behavior normal.         Thought Content: Thought content normal.         Judgment: Judgment normal.       There were no vitals filed for this visit.    Wt Readings from Last 3 Encounters:   08/15/23 72.2 kg (159 lb 3.2 oz)   05/30/23 74.8 kg (165 lb)   04/18/22 79.4 kg (175 lb)                         ECOG: (0) Fully Active - Able to Carry On All Pre-disease Performance Without Restriction  Fall Risk Assessment was completed, and patient is at low risk for falls.  PHQ-9 Total Score:         The patient is  experiencing fatigue. Fatigue score: 5    PT/OT Functional Screening: PT fx screen : No needs identified  Speech Functional Screening: Speech fx screen : No needs identified  Rehab to be ordered: Rehab to be ordered : No needs identified        Result Review :  The following data was reviewed by: Mildred Mcgovern MA on 08/15/2023:  No results found for: HGB, HCT, MCV, PLT, WBC, NEUTROABS, LYMPHSABS, MONOSABS, EOSABS, BASOSABS  No results found for: GLUCOSE, BUN, CREATININE, NA, K, CL, CO2, CALCIUM, PROTEINTOT, ALBUMIN, BILITOT, ALKPHOS, AST, ALT     No results found for: IRON, LABIRON, TRANSFERRIN, TIBC  No results found for: LDH, FERRITIN, [id number], FOLATE  No results found for:  PSA, CEA, AFP, ,     No Images in the past 120 days found..         Assessment and Plan:  Diagnoses and all orders for this visit:    1. Lymphocytosis (Primary)    2. Leukocytosis, unspecified type    Chronic leukocytosis and has had chronic absolute lymphocytosis since around at least midddle of June with the absolute lymph count above 5000 and in the range of 6800 to 9200. We discussed etiologies of the leukocytosis and lymphocytosis could be concerning for chronic lymphocytic leukemia. Will repeat CBC today with diff, peripheral smear and do a flow cytometry. We discussed generally no treatment is warranted unless she is having symptomatic lymphadenopathy, weight loss, early satiety, chronic infections or splenomegaly. Denies any fevers, chills, chronic infections or lymphadenopathy at this time.     She will follow up with MD in 4 weeks to discuss lab results.     I spent 30 minutes caring for Treva on this date of service. This time includes time spent by me in the following activities:preparing for the visit, reviewing tests, obtaining and/or reviewing a separately obtained history, performing a medically appropriate examination and/or evaluation , counseling and educating the patient/family/caregiver, ordering medications, tests, or procedures, referring and communicating with other health care professionals , documenting information in the medical record, and independently interpreting results and communicating that information with the patient/family/caregiver    Patient Follow Up: 4 weeks with MD.     Patient was given instructions and counseling regarding her condition or for health maintenance advice. Please see specific information pulled into the AVS if appropriate.     Mildred Mcgovern MA    8/15/2023    .tob

## 2023-08-16 LAB — BACTERIA SPEC AEROBE CULT: NO GROWTH

## 2023-08-17 ENCOUNTER — TELEPHONE (OUTPATIENT)
Dept: UROLOGY | Facility: CLINIC | Age: 73
End: 2023-08-17
Payer: MEDICARE

## 2023-08-17 NOTE — TELEPHONE ENCOUNTER
----- Message from SUZANNE Garber sent at 8/17/2023  7:33 AM EDT -----  Regarding: FW:  Please inform patient. Her urine culture was negative bacterial growth.  ----- Message -----  From: Phoebe Stewart  Sent: 8/15/2023  11:22 AM EDT  To: SUZANNE Garber

## 2023-08-17 NOTE — TELEPHONE ENCOUNTER
Attempted to call patient but no voicemail box set up. Pts urine cx was negative. HUB OKAY TO READ.

## 2023-08-18 ENCOUNTER — TELEPHONE (OUTPATIENT)
Dept: ONCOLOGY | Facility: HOSPITAL | Age: 73
End: 2023-08-18

## 2023-08-18 LAB
CYTO UR: NORMAL
LAB AP CASE REPORT: NORMAL
LAB AP CLINICAL INFORMATION: NORMAL
PATH REPORT.FINAL DX SPEC: NORMAL
PATH REPORT.GROSS SPEC: NORMAL
REF LAB TEST METHOD: NORMAL

## 2023-08-18 NOTE — PROGRESS NOTES
Spoke to patient regarding the CLL diagnosis and discussed to be sure to follow up as scheduled with dr. Castillo for additional plan of care at her next visit.     Patient understanding of information give.

## 2023-08-18 NOTE — TELEPHONE ENCOUNTER
Caller: POLO    Relationship: FROM [facility]    Best call back number: [phone] (PATIENT'S PHONE)    What is the best time to reach you: ASAP    Who are you requesting to speak with (clinical staff, provider,  specific staff member): CLINICAL    Is it okay if the provider responds through MyChart: PT CALLED [facility] SAYING SHE SAW SANDRA RAMIRES RECENTLY AND WAS GIVEN INFORMATION ABOUT POSSIBLE LEUKEMIA, POLO FROM THAT OFFICE CALLED TO LET US KNOW THAT THE PATIENT NEEDS A CALL BACK TO DISCUSS HER RESULTS FROM RECENT VISIT.  SHE WAS CONCERNED, SAYS SHE NEEDS TO FIND OUT IF SHE HAS IT OR NOT.  PT WAS NOT ON THE PHONE, BUT POLO WAS CALLING TO PASS ALONG THIS INFORMATION.  PLEASE NOTE THE PATIENT DOES NOT HAVE VOICEMAIL.

## 2023-09-20 ENCOUNTER — OFFICE VISIT (OUTPATIENT)
Dept: ONCOLOGY | Facility: HOSPITAL | Age: 73
End: 2023-09-20
Payer: MEDICARE

## 2023-09-20 VITALS
HEART RATE: 56 BPM | WEIGHT: 158.95 LBS | SYSTOLIC BLOOD PRESSURE: 167 MMHG | BODY MASS INDEX: 29.25 KG/M2 | DIASTOLIC BLOOD PRESSURE: 78 MMHG | TEMPERATURE: 97.5 F | RESPIRATION RATE: 18 BRPM | HEIGHT: 62 IN | OXYGEN SATURATION: 99 %

## 2023-09-20 DIAGNOSIS — C91.10 CLL (CHRONIC LYMPHOCYTIC LEUKEMIA): Primary | ICD-10-CM

## 2023-09-20 PROCEDURE — G0463 HOSPITAL OUTPT CLINIC VISIT: HCPCS | Performed by: INTERNAL MEDICINE

## 2023-09-20 NOTE — PROGRESS NOTES
Chief Complaint/Care Team   ELEVATED WHITE BLOOD CELL COUNT,UNSPECIFIED    Kimberly Jolly, Kimberly Kovacs, APRN    History of Present Illness     Diagnosis: CLL, diagnosed via flow cytometry of peripheral blood on 8/15/2023.    Current Treatment: Active surveillance  Previous Treatment: None    Treva Chan is a 73 y.o. female who presents to [facility] for evaluation and treatment for CLL.    PMH includes: kidney stones, anxiety, hypertension, carotid artery stenosis, HLD, osteoarthritis, PVC's, renal cell carcinoma, and prior stroke. She is a former smoker. Renal cell carcinoma with robot assisted lap right partial in May of 2018. All of her scans have been cancer free. She follows with Dr. Robbie Contreras.      Has had chronic leukocytosis on 3 prior lab draws. Unsure of length of chronicitity of the leukocytosis, but she does have absolute lymphocytosis consistently above 5000. She does report a recent 5 pound weight loss. She does have chronic fatigue. She denies any early satiety. Denies any left upper quadrant pain.      7/26/2023:  WBC 10.67 absolute lymphocyte count of 6800, absolute eosinophils of 400.   6/28/2023: WBC 13.62, absolute lymphocytosis of 8300.   6/16/2023: WBC 13.35, absolute lymphocytosis of 9200.     Patient underwent flow cytometric analysis of peripheral blood on 8/15/2023, which revealed CLL.    Patient denies any fever, chills, night sweats, lymphadenopathy, or unintentional weight loss.      Review of Systems   Constitutional:  Negative for appetite change, diaphoresis, fatigue, fever, unexpected weight gain and unexpected weight loss.   HENT:  Negative for hearing loss, mouth sores, sore throat, swollen glands, trouble swallowing and voice change.    Eyes:  Negative for blurred vision.   Respiratory:  Negative for cough, shortness of breath and wheezing.    Cardiovascular:  Negative for chest pain and palpitations.   Gastrointestinal:  " Negative for abdominal pain, blood in stool, constipation, diarrhea, nausea and vomiting.   Endocrine: Negative for cold intolerance and heat intolerance.   Genitourinary:  Negative for difficulty urinating, dysuria, frequency, hematuria and urinary incontinence.   Musculoskeletal:  Positive for arthralgias. Negative for back pain and myalgias.   Skin:  Negative for rash, skin lesions and wound.   Neurological:  Negative for dizziness, seizures, weakness, numbness and headache.   Hematological:  Does not bruise/bleed easily.   Psychiatric/Behavioral:  Negative for depressed mood. The patient is not nervous/anxious.    All other systems reviewed and are negative.     Oncology/Hematology History    No history exists.       Objective     Vitals:    09/20/23 1124   BP: 167/78   Pulse: 56   Resp: 18   Temp: 97.5 °F (36.4 °C)   TempSrc: Temporal   SpO2: 99%   Weight: 72.1 kg (158 lb 15.2 oz)   Height: 157 cm (61.81\")   PainSc: 0-No pain     ECOG score: 0         PHQ-9 Total Score:         Physical Exam  Exam conducted with a chaperone present.   Constitutional:       General: She is not in acute distress.     Appearance: Normal appearance.   HENT:      Head: Normocephalic and atraumatic.   Eyes:      Extraocular Movements: Extraocular movements intact.      Conjunctiva/sclera: Conjunctivae normal.   Neck:      Comments: No bilateral supraclavicular, cervical, or axillary lymphadenopathy palpated  Cardiovascular:      Pulses: Normal pulses.      Heart sounds: Normal heart sounds.   Pulmonary:      Effort: Pulmonary effort is normal.      Breath sounds: Normal breath sounds. No rhonchi or rales.   Abdominal:      General: Bowel sounds are normal. There is no distension.      Palpations: Abdomen is soft. There is no mass.      Tenderness: There is no abdominal tenderness.   Musculoskeletal:      Cervical back: Normal range of motion and neck supple.   Skin:     General: Skin is warm and dry.   Neurological:      Mental " Status: She is oriented to person, place, and time.         Past Medical History     Past Medical History:   Diagnosis Date    Acid reflux     Aneurysm     Cancer     Coronary artery disease     Dizziness     High cholesterol     Hypertension     Incontinence     Urinary tract infection      Current Outpatient Medications on File Prior to Visit   Medication Sig Dispense Refill    acetaminophen (TYLENOL) 500 MG tablet Every 8 (Eight) Hours.      amLODIPine-valsartan (EXFORGE) 5-160 MG per tablet amlodipine 5 mg-valsartan 160 mg tablet   TAKE ONE TABLET BY MOUTH EVERY DAY (TAKE WITH HCTZ)      aspirin 81 MG EC tablet Aspir-81 81 mg oral tablet,delayed release (DR/EC) take 1 tablet (81 mg) by oral route once daily   Active      atorvastatin (LIPITOR) 10 MG tablet atorvastatin 10 mg tablet   TAKE ONE TABLET BY MOUTH EVERY DAY      famotidine (PEPCID) 40 MG tablet famotidine 40 mg tablet   TAKE 1 TABLET BY MOUTH ONCE DAILY      meclizine (ANTIVERT) 25 MG tablet TAKE ONE TABLET BY MOUTH TWO TIMES PER DAY AS NEEDED      metoprolol succinate XL (TOPROL-XL) 100 MG 24 hr tablet metoprolol succinate  mg tablet,extended release 24 hr   TAKE ONE TABLET BY MOUTH EVERY DAY      omeprazole (priLOSEC) 20 MG capsule Take 1 capsule by mouth Daily.      oxybutynin XL (DITROPAN XL) 15 MG 24 hr tablet TAKE ONE TABLET BY MOUTH EVERY DAY : INCREASE IN DOSE      sertraline (ZOLOFT) 50 MG tablet Take 1 tablet by mouth Daily. (Patient not taking: Reported on 9/20/2023)       No current facility-administered medications on file prior to visit.      Allergies   Allergen Reactions    Levofloxacin Paresthesia    Latex Rash     Past Surgical History:   Procedure Laterality Date    COLONOSCOPY N/A     ENDOSCOPY N/A     GALLBLADDER SURGERY N/A     HERNIA REPAIR      NEPHRECTOMY PARTIAL Right      Social History     Socioeconomic History    Marital status:    Tobacco Use    Smoking status: Former     Types: Cigarettes    Smokeless  tobacco: Never   Vaping Use    Vaping Use: Never used   Substance and Sexual Activity    Alcohol use: Never    Drug use: Never    Sexual activity: Defer     History reviewed. No pertinent family history.    Results     Result Review   The following data was reviewed by: Richie Castillo MD on 09/20/2023:  Lab Results   Component Value Date    HGB 13.0 08/15/2023    HCT 39.1 08/15/2023    MCV 89.5 08/15/2023     08/15/2023    WBC 12.79 (H) 08/15/2023    NEUTROABS 2.94 08/15/2023    EOSABS 0.77 (H) 08/15/2023     Lab Results   Component Value Date    GLUCOSE 84 08/15/2023    BUN 20 08/15/2023    CREATININE 1.69 (H) 08/15/2023     08/15/2023    K 4.4 08/15/2023     08/15/2023    CO2 27.3 08/15/2023    CALCIUM 9.0 08/15/2023    PROTEINTOT 6.8 08/15/2023    ALBUMIN 4.4 08/15/2023    BILITOT 0.4 08/15/2023    ALKPHOS 68 08/15/2023    AST 27 08/15/2023    ALT 14 08/15/2023     No results found for: MG, PHOS, FREET4, TSH        No radiology results for the last day       Assessment & Plan     Diagnoses and all orders for this visit:    1. CLL (chronic lymphocytic leukemia) (Primary)  -     CBC & Differential; Future  -     Comprehensive Metabolic Panel; Future  -     Lactate Dehydrogenase; Future        Treva Chan is a 73 y.o. female who presents to [facility]AT[facility] for discussion of treatment for CLL diagnosed 8/15/2023 after patient underwent peripheral blood flow cytometric analysis.    -Patient without any B symptoms, reviewed most recent labs from 8/15/2023  - Discussed diagnosis, and prognosis of CLL  - Discussed plan for close observation with repeat lab work to assess for progression of disease.  - Counseled patient on concerning B symptoms or lab changes that would warrant earlier evaluation with me in clinic.    Patient and family verbalized understanding agreement with plan.    Please note that portions of this note were completed with a voice recognition  program.    Electronically signed by Richie Castillo MD, 09/20/23, 5:33 PM EDT.      Follow Up     I spent 45 minutes caring for Treva on this date of service. This time includes time spent by me in the following activities:preparing for the visit, reviewing tests, obtaining and/or reviewing a separately obtained history, performing a medically appropriate examination and/or evaluation , counseling and educating the patient/family/caregiver, ordering medications, tests, or procedures, referring and communicating with other health care professionals , documenting information in the medical record, independently interpreting results and communicating that information with the patient/family/caregiver, and care coordination.    This is an acute or chronic illness that poses a threat to life or bodily function. The above treatment plan involves a high risk of complications and/or mortality of patient management.    The patient was seen and examined. Work by the provider also included review and/or ordering of lab tests, review and/or ordering of radiology tests, review and/or ordering of medicine tests, discussion with other physicians or providers, independent review of data, obtaining old records, review/summation of old records, and/or other review.    I have reviewed the family history, social history, and past medical history for this patient. Previous information and data has been reviewed and updated as needed. I have reviewed and verified the chief complaint, history, and other documentation. The patient was interviewed and examined in the clinic and the chart reviewed. The previous observations, recommendations, and conclusions were reviewed including those of other providers.     The plan was discussed with the patient and/or family. The patient was given time to ask questions and these questions were answered. At the conclusion of their visit they had no additional questions or concerns and all questions  were answered to their satisfaction.    Patient was given instructions and counseling regarding her condition or for health maintenance advice. Please see specific information pulled into the AVS if appropriate.

## 2023-11-14 ENCOUNTER — OFFICE VISIT (OUTPATIENT)
Dept: UROLOGY | Facility: CLINIC | Age: 73
End: 2023-11-14
Payer: MEDICARE

## 2023-11-14 VITALS
RESPIRATION RATE: 14 BRPM | DIASTOLIC BLOOD PRESSURE: 67 MMHG | SYSTOLIC BLOOD PRESSURE: 133 MMHG | HEIGHT: 62 IN | BODY MASS INDEX: 28.63 KG/M2 | WEIGHT: 155.6 LBS | HEART RATE: 60 BPM

## 2023-11-14 DIAGNOSIS — N39.0 URINARY TRACT INFECTION WITHOUT HEMATURIA, SITE UNSPECIFIED: ICD-10-CM

## 2023-11-14 DIAGNOSIS — N39.41 URGE INCONTINENCE: Primary | ICD-10-CM

## 2023-11-14 LAB
BILIRUB BLD-MCNC: NEGATIVE MG/DL
CLARITY, POC: CLEAR
COLOR UR: YELLOW
EXPIRATION DATE: ABNORMAL
GLUCOSE UR STRIP-MCNC: NEGATIVE MG/DL
KETONES UR QL: NEGATIVE
LEUKOCYTE EST, POC: ABNORMAL
Lab: ABNORMAL
NITRITE UR-MCNC: NEGATIVE MG/ML
PH UR: 5 [PH] (ref 5–8)
PROT UR STRIP-MCNC: NEGATIVE MG/DL
RBC # UR STRIP: NEGATIVE /UL
SP GR UR: 1.02 (ref 1–1.03)
UROBILINOGEN UR QL: NORMAL

## 2023-11-14 RX ORDER — ESTRADIOL 0.1 MG/G
CREAM VAGINAL
Qty: 42.5 G | Refills: 6 | Status: SHIPPED | OUTPATIENT
Start: 2023-11-14

## 2023-11-14 NOTE — PROGRESS NOTES
Chief Complaint: Urinary Tract Infection (Pt here for follow up.  Pt is taking oxybutynin.  Pt states medication helps some.  Still has leaking.)    Subjective         History of Present Illness  Treva Chan is a 73 y.o. female presents to [facility]OLOG[facility] to be seen for follow-up Renal function.    She states that she has seen her primary care provider several times for urinary icontinence that is worsening     She has had several rounds of antibiotics based on symptoms    She is on oxybutynin from her PCP     She is very bothered by her leakage.    She states no burning or pain.    All of her urine cultures form her PCP are mixed kaya.        Previous:   CMP performed on 7/26/2023 reveals BUN of 14 creatinine 1.0 GFR 54.    At last visit she was found to have a urinary tract infection.    The patient had complained that she had near constant urinary urgency frequency and dark-colored urine however she was drinking very little throughout the day and was only drinking caffeinated beverages.    Did recommend at last visit she increase her water intake to gain better control of her urinary symptoms.    She states that she was doing better after antibiotics but got worse again a month later.     She states she had leftover bactrim and took this for the last 2 days.     She states she feels as if she is with more urgency and frequency and feels as if she has a UTI every month.    She states her symptoms of a Uti are burning with urination and foul odor to the urine.    She has not increased her water intake at this time.    Previous:    Robot assist lap right partial nx in May 2018.    Given myrbetriq samples at last visit related to incontinence.      She is on oxybutynin from her PCP. She states this helps some but she is still having urgency and frequency.     She states a foul odor to urine.     Recently been dealing with UTIs.    Admitted to the hospital in 2/2023 for UTI.    She is  drinking a cup of coffee 1-2 cokes a day and very little water.     She is having trouble with urge incontinence.  She is wearing 2 pads daily.    5/2023 creatinine 1.1 GFR 49 does not see nephrology     Patient states she had a chest x-ray however we do not have record of this for today's visit.     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     4/21 creatinine 1.2, GFR 44  4/21 chest x-ray - neg    1/20 CT abdomen/pelvis with and without no signs of metastatic or recurrent disease.  Previous partial nephrectomy site on the right kidney.    1/20 Cr  1.1, 51 GFR  1/18  CXR  neg    5/9/2018 lap right partial nephrectomy  Path:   clear cell RCC  G2 1.9 cm  margins negative  pT1a    Objective     Past Medical History:   Diagnosis Date    Acid reflux     Aneurysm     Cancer     Coronary artery disease     Dizziness     High cholesterol     Hypertension     Incontinence     Urinary tract infection        Past Surgical History:   Procedure Laterality Date    COLONOSCOPY N/A     ENDOSCOPY N/A     GALLBLADDER SURGERY N/A     HERNIA REPAIR      NEPHRECTOMY PARTIAL Right          Current Outpatient Medications:     acetaminophen (TYLENOL) 500 MG tablet, Every 8 (Eight) Hours., Disp: , Rfl:     amLODIPine-valsartan (EXFORGE) 5-160 MG per tablet, amlodipine 5 mg-valsartan 160 mg tablet  TAKE ONE TABLET BY MOUTH EVERY DAY (TAKE WITH HCTZ), Disp: , Rfl:     aspirin 81 MG EC tablet, Aspir-81 81 mg oral tablet,delayed release (DR/EC) take 1 tablet (81 mg) by oral route once daily   Active, Disp: , Rfl:     atorvastatin (LIPITOR) 10 MG tablet, atorvastatin 10 mg tablet  TAKE ONE TABLET BY MOUTH EVERY DAY, Disp: , Rfl:     famotidine (PEPCID) 40 MG tablet, famotidine 40 mg tablet  TAKE 1 TABLET BY MOUTH ONCE DAILY, Disp: , Rfl:     meclizine (ANTIVERT) 25 MG tablet, TAKE ONE TABLET BY MOUTH TWO TIMES PER DAY AS NEEDED, Disp: , Rfl:     metoprolol succinate XL (TOPROL-XL) 100 MG 24 hr tablet, metoprolol succinate ER  "100 mg tablet,extended release 24 hr  TAKE ONE TABLET BY MOUTH EVERY DAY, Disp: , Rfl:     omeprazole (priLOSEC) 20 MG capsule, Take 1 capsule by mouth Daily., Disp: , Rfl:     estradiol (ESTRACE) 0.1 MG/GM vaginal cream, Apply a pea-sized amount inside the vagina and rub in and applied another pea-sized amount around the vestibule and massage and as well as around urethra.  3 times a week at night, Disp: 42.5 g, Rfl: 6    Vibegron 75 MG tablet, Take 1 tablet by mouth Daily., Disp: 90 tablet, Rfl: 3    Allergies   Allergen Reactions    Levofloxacin Paresthesia    Latex Rash        History reviewed. No pertinent family history.    Social History     Socioeconomic History    Marital status:    Tobacco Use    Smoking status: Former     Types: Cigarettes     Passive exposure: Current    Smokeless tobacco: Never   Vaping Use    Vaping Use: Never used   Substance and Sexual Activity    Alcohol use: Never    Drug use: Never    Sexual activity: Defer       Vital Signs:   /67 (BP Location: Left arm, Patient Position: Sitting)   Pulse 60   Resp 14   Ht 157.5 cm (62\")   Wt 70.6 kg (155 lb 9.6 oz)   BMI 28.46 kg/m²      Physical Exam     Result Review :   The following data was reviewed by: SUZANNE Garber on 11/14/2023:  Results for orders placed or performed in visit on 11/14/23   POC Urinalysis Dipstick, Automated    Specimen: Urine   Result Value Ref Range    Color Yellow Yellow, Straw, Dark Yellow, Jolie    Clarity, UA Clear Clear    Specific Gravity  1.025 1.005 - 1.030    pH, Urine 5.0 5.0 - 8.0    Leukocytes Small (1+) (A) Negative    Nitrite, UA Negative Negative    Protein, POC Negative Negative mg/dL    Glucose, UA Negative Negative mg/dL    Ketones, UA Negative Negative    Urobilinogen, UA Normal Normal, 0.2 E.U./dL    Bilirubin Negative Negative    Blood, UA Negative Negative    Lot Number [id number]     Expiration Date 2,024/9                 Procedures        Assessment and Plan    Diagnoses " and all orders for this visit:    1. Urge incontinence (Primary)  -     POC Urinalysis Dipstick, Automated  -     Vibegron 75 MG tablet; Take 1 tablet by mouth Daily.  Dispense: 90 tablet; Refill: 3  -     estradiol (ESTRACE) 0.1 MG/GM vaginal cream; Apply a pea-sized amount inside the vagina and rub in and applied another pea-sized amount around the vestibule and massage and as well as around urethra.  3 times a week at night  Dispense: 42.5 g; Refill: 6    2. Urinary tract infection without hematuria, site unspecified  -     POC Urinalysis Dipstick, Automated        Michael with patient at this point time given that the oxygen is no longer controlling her symptoms and with the emerging studies that represents the correlation between anticholinergic medications and early cognitive decline I would like to remove her from this medication and try her on Gemtesa.    Given her recurrent urgency and frequency as well I would like to get her started on vaginal Estrace.    This will also help with her potential recurrent UTIs although I am unsure at this point in time if she is actually having UTIs or not.      I spent 10 minutes caring for Treva on this date of service. This time includes time spent by me in the following activities:reviewing tests, obtaining and/or reviewing a separately obtained history, performing a medically appropriate examination and/or evaluation , counseling and educating the patient/family/caregiver, ordering medications, tests, or procedures, and documenting information in the medical record  Follow Up   Return in about 3 months (around 2/14/2024) for f/u OAb and UTI.  Patient was given instructions and counseling regarding her condition or for health maintenance advice. Please see specific information pulled into the AVS if appropriate.         This document has been electronically signed by SUZANNE Garber  November 14, 2023 09:34 EST

## 2023-11-22 ENCOUNTER — TELEPHONE (OUTPATIENT)
Dept: SURGERY | Facility: CLINIC | Age: 73
End: 2023-11-22
Payer: MEDICARE

## 2023-11-27 NOTE — TELEPHONE ENCOUNTER
Spoke to pt.  Pt will need samples of Gemtesa.  Gemtesa is over $160.00.  The Estrace cream is $40.00.  I will ask Marimar to send in to pharmacy for compound.  On the Gemtesa we will give her samples.  Pt is of understanding.  Pharmacy was verified.  
The Gemtesa is to expensive & estrace vaginal cream is to expensive. She is taking the samples of the tablets. She wants a alternative. She is aware she wont be contacted until next week.   
23-Jun-2022 22:53

## 2023-11-28 DIAGNOSIS — N39.41 URGE INCONTINENCE: Primary | ICD-10-CM

## 2023-11-28 RX ORDER — VIBEGRON 75 MG/1
75 TABLET, FILM COATED ORAL DAILY
Qty: 49 TABLET | Refills: 0 | COMMUNITY
Start: 2023-11-28

## 2023-12-04 ENCOUNTER — TELEPHONE (OUTPATIENT)
Dept: ONCOLOGY | Facility: HOSPITAL | Age: 73
End: 2023-12-04

## 2023-12-04 NOTE — TELEPHONE ENCOUNTER
Caller: Treva Chan    Relationship to patient: Self    Best call back number: [phone]    Patient is needing: TO KNOW IF LAB ORDERS WERE SENT TO [facility].

## 2023-12-11 ENCOUNTER — TELEPHONE (OUTPATIENT)
Dept: ONCOLOGY | Facility: HOSPITAL | Age: 73
End: 2023-12-11
Payer: MEDICARE

## 2023-12-11 NOTE — TELEPHONE ENCOUNTER
Caller: Treva Chan    Relationship: Self    Best call back number: [phone]    What is the best time to reach you: ANYTIME    Who are you requesting to speak with (clinical staff, provider, specific staff member): SCHEDULING    What was the call regarding: PATIENT RETURNING CALL

## 2023-12-14 ENCOUNTER — OFFICE VISIT (OUTPATIENT)
Dept: ONCOLOGY | Facility: HOSPITAL | Age: 73
End: 2023-12-14
Payer: MEDICARE

## 2023-12-14 VITALS
BODY MASS INDEX: 28.44 KG/M2 | OXYGEN SATURATION: 98 % | HEART RATE: 59 BPM | TEMPERATURE: 97.3 F | DIASTOLIC BLOOD PRESSURE: 72 MMHG | RESPIRATION RATE: 18 BRPM | SYSTOLIC BLOOD PRESSURE: 164 MMHG | WEIGHT: 154.54 LBS | HEIGHT: 62 IN

## 2023-12-14 DIAGNOSIS — C91.10 CLL (CHRONIC LYMPHOCYTIC LEUKEMIA): Primary | ICD-10-CM

## 2023-12-14 PROCEDURE — G0463 HOSPITAL OUTPT CLINIC VISIT: HCPCS | Performed by: INTERNAL MEDICINE

## 2023-12-14 RX ORDER — SULFAMETHOXAZOLE AND TRIMETHOPRIM 800; 160 MG/1; MG/1
TABLET ORAL
COMMUNITY
Start: 2023-10-31

## 2023-12-14 NOTE — PROGRESS NOTES
Chief Complaint/Care Team   Elevated white blood cell count, unspecified    Kimberly Jolly, Kimberly Kovacs, APRN    History of Present Illness     Diagnosis: CLL, diagnosed via flow cytometry of peripheral blood on 8/15/2023.    Current Treatment: Active surveillance  Previous Treatment: None    Treva Chan is a 73 y.o. female who presents to [facility] for evaluation and treatment for CLL.    PMH includes: kidney stones, anxiety, hypertension, carotid artery stenosis, HLD, osteoarthritis, PVC's, renal cell carcinoma, and prior stroke. She is a former smoker. Renal cell carcinoma with robot assisted lap right partial in May of 2018. All of her scans have been cancer free. She follows with Dr. Robbie Contreras.      Has had chronic leukocytosis on 3 prior lab draws. Unsure of length of chronicitity of the leukocytosis, but she does have absolute lymphocytosis consistently above 5000. She does report a recent 5 pound weight loss. She does have chronic fatigue. She denies any early satiety. Denies any left upper quadrant pain.      7/26/2023:  WBC 10.67 absolute lymphocyte count of 6800, absolute eosinophils of 400.   6/28/2023: WBC 13.62, absolute lymphocytosis of 8300.   6/16/2023: WBC 13.35, absolute lymphocytosis of 9200.     Patient underwent flow cytometric analysis of peripheral blood on 8/15/2023, which revealed CLL.    Pt underwent lab work at outside facility for surveillance of her CLL and here to discuss those results. Patient denies any fever, recent infection, chills, night sweats, lymphadenopathy, or unintentional weight loss.      Review of Systems   Constitutional:  Negative for appetite change, diaphoresis, fatigue, fever, unexpected weight gain and unexpected weight loss.   HENT:  Negative for hearing loss, mouth sores, sore throat, swollen glands, trouble swallowing and voice change.    Eyes:  Negative for blurred vision.   Respiratory:  Negative  "for cough, shortness of breath and wheezing.    Cardiovascular:  Negative for chest pain and palpitations.   Gastrointestinal:  Negative for abdominal pain, blood in stool, constipation, diarrhea, nausea and vomiting.   Endocrine: Negative for cold intolerance and heat intolerance.   Genitourinary:  Negative for difficulty urinating, dysuria, frequency, hematuria and urinary incontinence.   Musculoskeletal:  Positive for arthralgias. Negative for back pain and myalgias.   Skin:  Negative for rash, skin lesions and wound.   Neurological:  Negative for dizziness, seizures, weakness, numbness and headache.   Hematological:  Does not bruise/bleed easily.   Psychiatric/Behavioral:  Negative for depressed mood. The patient is not nervous/anxious.    All other systems reviewed and are negative.       Oncology/Hematology History    No history exists.       Objective     Vitals:    12/14/23 1117   BP: 164/72   Pulse: 59   Resp: 18   Temp: 97.3 °F (36.3 °C)   TempSrc: Temporal   SpO2: 98%   Weight: 70.1 kg (154 lb 8.7 oz)   Height: 157.5 cm (62.01\")   PainSc: 0-No pain       ECOG score: 0         PHQ-9 Total Score:         Physical Exam  Vitals reviewed. Exam conducted with a chaperone present.   Constitutional:       General: She is not in acute distress.     Appearance: Normal appearance.   HENT:      Head: Normocephalic and atraumatic.   Eyes:      Extraocular Movements: Extraocular movements intact.      Conjunctiva/sclera: Conjunctivae normal.   Pulmonary:      Effort: Pulmonary effort is normal.   Musculoskeletal:      Cervical back: Normal range of motion and neck supple.   Skin:     General: Skin is warm and dry.      Findings: No bruising.   Neurological:      Mental Status: She is oriented to person, place, and time.           Past Medical History     Past Medical History:   Diagnosis Date    Acid reflux     Aneurysm     Cancer     Coronary artery disease     Dizziness     High cholesterol     Hypertension     " Incontinence     Urinary tract infection      Current Outpatient Medications on File Prior to Visit   Medication Sig Dispense Refill    acetaminophen (TYLENOL) 500 MG tablet Every 8 (Eight) Hours.      amLODIPine-valsartan (EXFORGE) 5-160 MG per tablet amlodipine 5 mg-valsartan 160 mg tablet   TAKE ONE TABLET BY MOUTH EVERY DAY (TAKE WITH HCTZ)      aspirin 81 MG EC tablet Aspir-81 81 mg oral tablet,delayed release (DR/EC) take 1 tablet (81 mg) by oral route once daily   Active      atorvastatin (LIPITOR) 10 MG tablet atorvastatin 10 mg tablet   TAKE ONE TABLET BY MOUTH EVERY DAY      estradiol (ESTRACE) 0.1 MG/GM vaginal cream Apply a pea-sized amount inside the vagina and rub in and applied another pea-sized amount around the vestibule and massage and as well as around urethra.  3 times a week at night 42.5 g 6    famotidine (PEPCID) 40 MG tablet famotidine 40 mg tablet   TAKE 1 TABLET BY MOUTH ONCE DAILY      meclizine (ANTIVERT) 25 MG tablet TAKE ONE TABLET BY MOUTH TWO TIMES PER DAY AS NEEDED      metoprolol succinate XL (TOPROL-XL) 100 MG 24 hr tablet metoprolol succinate  mg tablet,extended release 24 hr   TAKE ONE TABLET BY MOUTH EVERY DAY      omeprazole (priLOSEC) 20 MG capsule Take 1 capsule by mouth Daily.      sulfamethoxazole-trimethoprim (BACTRIM DS,SEPTRA DS) 800-160 MG per tablet TAKE 1 TABLET BY MOUTH EVERY 12 HOURS FOR 7 DAYS.      Vibegron (Gemtesa) 75 MG tablet Take 1 tablet by mouth Daily. 49 tablet 0    Vibegron 75 MG tablet Take 1 tablet by mouth Daily. 90 tablet 3     No current facility-administered medications on file prior to visit.      Allergies   Allergen Reactions    Levofloxacin Paresthesia    Latex Rash     Past Surgical History:   Procedure Laterality Date    COLONOSCOPY N/A     ENDOSCOPY N/A     GALLBLADDER SURGERY N/A     HERNIA REPAIR      NEPHRECTOMY PARTIAL Right      Social History     Socioeconomic History    Marital status:    Tobacco Use    Smoking status:  "Former     Types: Cigarettes     Passive exposure: Current    Smokeless tobacco: Never   Vaping Use    Vaping Use: Never used   Substance and Sexual Activity    Alcohol use: Never    Drug use: Never    Sexual activity: Defer     History reviewed. No pertinent family history.    Results     Result Review   The following data was reviewed by: Richie Castillo MD on 09/20/2023:  Lab Results   Component Value Date    HGB 13.0 08/15/2023    HCT 39.1 08/15/2023    MCV 89.5 08/15/2023     08/15/2023    WBC 12.79 (H) 08/15/2023    NEUTROABS 2.94 08/15/2023    EOSABS 0.77 (H) 08/15/2023     Lab Results   Component Value Date    GLUCOSE 84 08/15/2023    BUN 20 08/15/2023    CREATININE 1.69 (H) 08/15/2023     08/15/2023    K 4.4 08/15/2023     08/15/2023    CO2 27.3 08/15/2023    CALCIUM 9.0 08/15/2023    PROTEINTOT 6.8 08/15/2023    ALBUMIN 4.4 08/15/2023    BILITOT 0.4 08/15/2023    ALKPHOS 68 08/15/2023    AST 27 08/15/2023    ALT 14 08/15/2023     No results found for: \"MG\", \"PHOS\", \"FREET4\", \"TSH\"        No radiology results for the last day       Assessment & Plan     Diagnoses and all orders for this visit:    1. CLL (chronic lymphocytic leukemia) (Primary)          Treva Chan is a 73 y.o. female who presents to [facility] for discussion of treatment for CLL diagnosed 8/15/2023 after patient underwent peripheral blood flow cytometric analysis.    -Patient without any B symptoms, reviewed most recent labs from 12/6/2023  - Discussed diagnosis, and prognosis of CLL with pt and  again today  - Discussed plan for close observation with repeat lab work to assess for progression of disease.  - Counseled patient on concerning B symptoms or lab changes that would warrant earlier evaluation with me in clinic.    Plan for pt follow up in 6 months with CBC, CMP and LDH.    Patient and family verbalized understanding agreement with plan.    Please note that " portions of this note were completed with a voice recognition program.      Electronically signed by Richie Castillo MD, 12/14/23, 11:59 AM EST.      Follow Up     I spent 30 minutes caring for Treva on this date of service. This time includes time spent by me in the following activities:preparing for the visit, reviewing tests, obtaining and/or reviewing a separately obtained history, performing a medically appropriate examination and/or evaluation , counseling and educating the patient/family/caregiver, ordering medications, tests, or procedures, referring and communicating with other health care professionals , documenting information in the medical record, independently interpreting results and communicating that information with the patient/family/caregiver, and care coordination.    This is an acute or chronic illness that poses a threat to life or bodily function. The above treatment plan involves a high risk of complications and/or mortality of patient management.    The patient was seen and examined. Work by the provider also included review and/or ordering of lab tests, review and/or ordering of radiology tests, review and/or ordering of medicine tests, discussion with other physicians or providers, independent review of data, obtaining old records, review/summation of old records, and/or other review.    I have reviewed the family history, social history, and past medical history for this patient. Previous information and data has been reviewed and updated as needed. I have reviewed and verified the chief complaint, history, and other documentation. The patient was interviewed and examined in the clinic and the chart reviewed. The previous observations, recommendations, and conclusions were reviewed including those of other providers.     The plan was discussed with the patient and/or family. The patient was given time to ask questions and these questions were answered. At the conclusion of their visit  they had no additional questions or concerns and all questions were answered to their satisfaction.    Patient was given instructions and counseling regarding her condition or for health maintenance advice. Please see specific information pulled into the AVS if appropriate.

## 2024-02-16 ENCOUNTER — OFFICE VISIT (OUTPATIENT)
Dept: UROLOGY | Facility: CLINIC | Age: 74
End: 2024-02-16
Payer: MEDICARE

## 2024-02-16 VITALS
HEIGHT: 62 IN | HEART RATE: 63 BPM | WEIGHT: 161.4 LBS | DIASTOLIC BLOOD PRESSURE: 83 MMHG | BODY MASS INDEX: 29.7 KG/M2 | SYSTOLIC BLOOD PRESSURE: 162 MMHG

## 2024-02-16 DIAGNOSIS — N39.41 URGE INCONTINENCE: ICD-10-CM

## 2024-02-16 DIAGNOSIS — Z85.528 HISTORY OF RENAL CELL CARCINOMA: Primary | ICD-10-CM

## 2024-02-16 LAB
BACTERIA UR QL AUTO: ABNORMAL /HPF
BILIRUB BLD-MCNC: NEGATIVE MG/DL
CLARITY, POC: ABNORMAL
COLOR UR: ABNORMAL
EXPIRATION DATE: ABNORMAL
GLUCOSE UR STRIP-MCNC: NEGATIVE MG/DL
HYALINE CASTS UR QL AUTO: ABNORMAL /LPF
KETONES UR QL: NEGATIVE
LEUKOCYTE EST, POC: NEGATIVE
Lab: ABNORMAL
NITRITE UR-MCNC: POSITIVE MG/ML
PH UR: 5.5 [PH] (ref 5–8)
PROT UR STRIP-MCNC: NEGATIVE MG/DL
RBC # UR STRIP: ABNORMAL /HPF
RBC # UR STRIP: ABNORMAL /UL
REF LAB TEST METHOD: ABNORMAL
SP GR UR STRIP: 1.03 (ref 1–1.03)
SQUAMOUS #/AREA URNS HPF: ABNORMAL /HPF
UROBILINOGEN UR QL: NORMAL
WBC # UR STRIP: ABNORMAL /HPF

## 2024-02-16 PROCEDURE — 81015 MICROSCOPIC EXAM OF URINE: CPT | Performed by: NURSE PRACTITIONER

## 2024-02-19 ENCOUNTER — TELEPHONE (OUTPATIENT)
Dept: UROLOGY | Facility: CLINIC | Age: 74
End: 2024-02-19
Payer: MEDICARE

## 2024-02-19 NOTE — TELEPHONE ENCOUNTER
----- Message from SUZANNE Garber sent at 2/17/2024 10:07 AM EST -----  Regarding: FW:  Please inform patient that her urinalysis does not reveal any blood. I will not trigger a urine culture for this as she was having no  symptoms.  ----- Message -----  From: Lab, Background User  Sent: 2/16/2024  10:44 PM EST  To: SUZANNE Garber

## 2024-05-03 ENCOUNTER — OFFICE VISIT (OUTPATIENT)
Dept: UROLOGY | Facility: CLINIC | Age: 74
End: 2024-05-03
Payer: MEDICARE

## 2024-05-03 VITALS
SYSTOLIC BLOOD PRESSURE: 145 MMHG | HEART RATE: 67 BPM | WEIGHT: 155.2 LBS | RESPIRATION RATE: 14 BRPM | DIASTOLIC BLOOD PRESSURE: 80 MMHG | BODY MASS INDEX: 28.56 KG/M2 | HEIGHT: 62 IN

## 2024-05-03 DIAGNOSIS — N39.0 URINARY TRACT INFECTION WITHOUT HEMATURIA, SITE UNSPECIFIED: ICD-10-CM

## 2024-05-03 DIAGNOSIS — Z85.528 HISTORY OF RENAL CELL CARCINOMA: Primary | ICD-10-CM

## 2024-05-03 DIAGNOSIS — N39.41 URGE INCONTINENCE: ICD-10-CM

## 2024-05-03 PROBLEM — M53.9 MULTILEVEL DEGENERATIVE DISC DISEASE: Status: ACTIVE | Noted: 2017-07-19

## 2024-05-03 PROBLEM — R13.10 DYSPHAGIA: Status: ACTIVE | Noted: 2023-07-04

## 2024-05-03 PROBLEM — I35.1 AORTIC INSUFFICIENCY: Status: RESOLVED | Noted: 2023-05-30 | Resolved: 2024-05-03

## 2024-05-03 PROBLEM — R06.00 DYSPNEA: Status: ACTIVE | Noted: 2018-06-27

## 2024-05-03 PROBLEM — C91.10 CHRONIC LYMPHOCYTIC LEUKEMIA: Status: ACTIVE | Noted: 2023-10-30

## 2024-05-03 PROBLEM — I71.9 AORTIC ANEURYSM: Status: RESOLVED | Noted: 2018-06-27 | Resolved: 2024-05-03

## 2024-05-03 PROBLEM — J30.9 ALLERGIC RHINITIS: Status: RESOLVED | Noted: 2018-06-27 | Resolved: 2024-05-03

## 2024-05-03 LAB
BILIRUB BLD-MCNC: NEGATIVE MG/DL
CLARITY, POC: CLEAR
COLOR UR: YELLOW
EXPIRATION DATE: NORMAL
GLUCOSE UR STRIP-MCNC: NEGATIVE MG/DL
KETONES UR QL: NEGATIVE
LEUKOCYTE EST, POC: NEGATIVE
Lab: NORMAL
NITRITE UR-MCNC: NEGATIVE MG/ML
PH UR: 5.5 [PH] (ref 5–8)
PROT UR STRIP-MCNC: NEGATIVE MG/DL
RBC # UR STRIP: NEGATIVE /UL
SP GR UR: 1.03 (ref 1–1.03)
URINE VOLUME: 0
UROBILINOGEN UR QL: NORMAL

## 2024-05-03 RX ORDER — MELOXICAM 7.5 MG/1
1 TABLET ORAL DAILY
COMMUNITY
Start: 2024-05-01

## 2024-05-03 NOTE — PROGRESS NOTES
"Chief Complaint: Urinary Incontinence (Pt here for follow up.  Pt is not having any symptoms. Pt states has not had any infections in \"awhile.\"  Pt is taking Mybertriq.  Pt states medication is helping some.)    Subjective         History of Present Illness  Treva Chan is a 73 y.o. female presents to [facility] UROLOGY to be seen for follow-up history of renal cell carcinoma and urge urinary incontinence.    At last visit started her on myrbetriq she states she feels this is helping.    She states that she is not using estrace. States just doesn't use it.    nocturia x 3-5    No UTIs since we last saw her.     No UTI symptoms.    She states she is still leaking 4-5 x a day.     She is still wearing depends.    She states that she was not able to afford the myrbetriq however this was $35    She is drinking a little more water  cup of coffee and 1-2 pepsis a day.     She is still drinking pepsi late in the afternoon and evening.      Previous:    She states that she has seen her primary care provider several times for urinary icontinence that is worsening     She has had several rounds of antibiotics based on symptoms    She is on oxybutynin from her PCP     She is very bothered by her leakage.    She states no burning or pain.    All of her urine cultures form her PCP are mixed kaya.    CMP performed on 7/26/2023 reveals BUN of 14 creatinine 1.0 GFR 54.    At last visit she was found to have a urinary tract infection.    The patient had complained that she had near constant urinary urgency frequency and dark-colored urine however she was drinking very little throughout the day and was only drinking caffeinated beverages.    Did recommend at last visit she increase her water intake to gain better control of her urinary symptoms.    She states that she was doing better after antibiotics but got worse again a month later.     She states she had leftover bactrim and took this for the last 2 days. "     She states she feels as if she is with more urgency and frequency and feels as if she has a UTI every month.    She states her symptoms of a Uti are burning with urination and foul odor to the urine.    She has not increased her water intake at this time.    Previous:    Robot assist lap right partial nx in May 2018.    Given myrbetriq samples at last visit related to incontinence.      She is on oxybutynin from her PCP. She states this helps some but she is still having urgency and frequency.     She states a foul odor to urine.     Recently been dealing with UTIs.    Admitted to the hospital in 2/2023 for UTI.    She is drinking a cup of coffee 1-2 cokes a day and very little water.     She is having trouble with urge incontinence.  She is wearing 2 pads daily.    5/2023 creatinine 1.1 GFR 49 does not see nephrology     Patient states she had a chest x-ray however we do not have record of this for today's visit.     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     4/21 creatinine 1.2, GFR 44  4/21 chest x-ray - neg    1/20 CT abdomen/pelvis with and without no signs of metastatic or recurrent disease.  Previous partial nephrectomy site on the right kidney.    1/20 Cr  1.1, 51 GFR  1/18  CXR  neg    5/9/2018 lap right partial nephrectomy  Path:   clear cell RCC  G2 1.9 cm  margins negative  pT1a    Objective     Past Medical History:   Diagnosis Date    Acid reflux     Aneurysm     Cancer     Coronary artery disease     Dizziness     High cholesterol     Hypertension     Incontinence     Urinary tract infection        Past Surgical History:   Procedure Laterality Date    COLONOSCOPY N/A     ENDOSCOPY N/A     GALLBLADDER SURGERY N/A     HERNIA REPAIR      NEPHRECTOMY PARTIAL Right          Current Outpatient Medications:     meloxicam (MOBIC) 7.5 MG tablet, Take 1 tablet by mouth Daily., Disp: , Rfl:     acetaminophen (TYLENOL) 500 MG tablet, Every 8 (Eight) Hours., Disp: , Rfl:      amLODIPine-valsartan (EXFORGE) 5-160 MG per tablet, amlodipine 5 mg-valsartan 160 mg tablet  TAKE ONE TABLET BY MOUTH EVERY DAY (TAKE WITH HCTZ), Disp: , Rfl:     aspirin 81 MG EC tablet, Aspir-81 81 mg oral tablet,delayed release (DR/EC) take 1 tablet (81 mg) by oral route once daily   Active, Disp: , Rfl:     atorvastatin (LIPITOR) 10 MG tablet, atorvastatin 10 mg tablet  TAKE ONE TABLET BY MOUTH EVERY DAY, Disp: , Rfl:     estradiol (ESTRACE) 0.1 MG/GM vaginal cream, Apply a pea-sized amount inside the vagina and rub in and applied another pea-sized amount around the vestibule and massage and as well as around urethra.  3 times a week at night, Disp: 42.5 g, Rfl: 6    famotidine (PEPCID) 40 MG tablet, famotidine 40 mg tablet  TAKE 1 TABLET BY MOUTH ONCE DAILY, Disp: , Rfl:     meclizine (ANTIVERT) 25 MG tablet, TAKE ONE TABLET BY MOUTH TWO TIMES PER DAY AS NEEDED, Disp: , Rfl:     metoprolol succinate XL (TOPROL-XL) 100 MG 24 hr tablet, metoprolol succinate  mg tablet,extended release 24 hr  TAKE ONE TABLET BY MOUTH EVERY DAY, Disp: , Rfl:     Mirabegron ER (Myrbetriq) 50 MG tablet sustained-release 24 hour 24 hr tablet, Take 50 mg by mouth Daily., Disp: 56 tablet, Rfl: 0    Mirabegron ER (Myrbetriq) 50 MG tablet sustained-release 24 hour 24 hr tablet, Take 50 mg by mouth Daily., Disp: 90 tablet, Rfl: 3    Allergies   Allergen Reactions    Levofloxacin Paresthesia and Unknown - Low Severity    Latex Rash and Unknown - Low Severity        History reviewed. No pertinent family history.    Social History     Socioeconomic History    Marital status:    Tobacco Use    Smoking status: Former     Types: Cigarettes     Passive exposure: Current    Smokeless tobacco: Never   Vaping Use    Vaping status: Never Used   Substance and Sexual Activity    Alcohol use: Never    Drug use: Never    Sexual activity: Defer       Vital Signs:   /80 (BP Location: Left arm, Patient Position: Sitting)   Pulse 67    "Resp 14   Ht 157.5 cm (62.01\")   Wt 70.4 kg (155 lb 3.2 oz)   BMI 28.38 kg/m²      Physical Exam     Result Review :   The following data was reviewed by: SUZANNE Garber on 5/3/2024:  Results for orders placed or performed in visit on 05/03/24   Bladder Scan   Result Value Ref Range    Urine Volume 0    POC Urinalysis Dipstick, Automated    Specimen: Urine   Result Value Ref Range    Color Yellow Yellow, Straw, Dark Yellow, Jolie    Clarity, UA Clear Clear    Specific Gravity  1.030 1.005 - 1.030    pH, Urine 5.5 5.0 - 8.0    Leukocytes Negative Negative    Nitrite, UA Negative Negative    Protein, POC Negative Negative mg/dL    Glucose, UA Negative Negative mg/dL    Ketones, UA Negative Negative    Urobilinogen, UA Normal Normal, 0.2 E.U./dL    Bilirubin Negative Negative    Blood, UA Negative Negative    Lot Number [id number]     Expiration Date 2,025/3         Bladder Scan interpretation 05/03/2024    Estimation of residual urine via [employer] [address] Bladder Scan  MA/nurse performing:  marlene mendez Ma   Residual Urine: 0 ml  Indication: History of renal cell carcinoma    Urge incontinence    Urinary tract infection without hematuria, site unspecified   Position: Supine  Examination: Incremental scanning of the suprapubic area using 2.0 MHz transducer using copious amounts of acoustic gel.   Findings: An anechoic area was demonstrated which represented the bladder, with measurement of residual urine as noted. I inspected this myself. In that the residual urine was stable or insignificant, refer to plan for treatment and plan necessary at this time.             Procedures        Assessment and Plan    Diagnoses and all orders for this visit:    1. History of renal cell carcinoma (Primary)  -     POC Urinalysis Dipstick, Automated  -     Bladder Scan  -     Mirabegron ER (Myrbetriq) 50 MG tablet sustained-release 24 hour 24 hr tablet; Take 50 mg by mouth Daily.  Dispense: 56 tablet; Refill: 0    2. Urge " incontinence  -     POC Urinalysis Dipstick, Automated  -     Bladder Scan  -     Mirabegron ER (Myrbetriq) 50 MG tablet sustained-release 24 hour 24 hr tablet; Take 50 mg by mouth Daily.  Dispense: 56 tablet; Refill: 0  -     Mirabegron ER (Myrbetriq) 50 MG tablet sustained-release 24 hour 24 hr tablet; Take 50 mg by mouth Daily.  Dispense: 90 tablet; Refill: 3    3. Urinary tract infection without hematuria, site unspecified  -     POC Urinalysis Dipstick, Automated  -     Bladder Scan  -     Mirabegron ER (Myrbetriq) 50 MG tablet sustained-release 24 hour 24 hr tablet; Take 50 mg by mouth Daily.  Dispense: 56 tablet; Refill: 0      Discussed once again her low fluid intake as well as excess caffeine can be contributing to her symptoms as well as her caffeine intake especially late into the evening..    She will work on increasing water intake.     Recommended she try using the estrace cream more consistently.     We will try increasing her Myrbetriq dosage to see if this will gain a little bit better control of her urinary symptoms             I spent 10 minutes caring for Treva on this date of service. This time includes time spent by me in the following activities:reviewing tests, obtaining and/or reviewing a separately obtained history, performing a medically appropriate examination and/or evaluation , counseling and educating the patient/family/caregiver, ordering medications, tests, or procedures, and documenting information in the medical record  Follow Up   Return in about 3 months (around 8/3/2024) for f/u incontinence.  Patient was given instructions and counseling regarding her condition or for health maintenance advice. Please see specific information pulled into the AVS if appropriate.         This document has been electronically signed by SUZANNE Graber  May 3, 2024 13:24 EDT

## 2024-06-14 ENCOUNTER — TELEPHONE (OUTPATIENT)
Dept: ONCOLOGY | Facility: HOSPITAL | Age: 74
End: 2024-06-14
Payer: MEDICARE

## 2024-06-14 NOTE — TELEPHONE ENCOUNTER
Caller: Treva Chan    Relationship: Self    Best call back number: [phone]    What is the best time to reach you: ANYTIME    Who are you requesting to speak with (clinical staff, provider,  specific staff member): CLINICAL    What was the call regarding: PATIENT HAD HER LABS DRAWN THIS WEEK WITH MARTHA GAYTAN, AND WAS TOLD HER WBC WAS HIGH.    HER APPT WITH DR. WHITE IS ON 6/19/2024.    CALL TO DISCUSS

## 2024-06-14 NOTE — TELEPHONE ENCOUNTER
Spoke with patient, advised that we have not received a copy of any labs from her PCP or the hospital that is near her. She will have them faxed or bring them to her follow up on 6/19. Instructed to maintain all follow up visits and to contact the office with any questions or concerns. Verbalized understanding of all information discussed.

## 2024-06-18 ENCOUNTER — TELEPHONE (OUTPATIENT)
Dept: ONCOLOGY | Facility: HOSPITAL | Age: 74
End: 2024-06-18

## 2024-06-18 NOTE — TELEPHONE ENCOUNTER
[facility] received a fax that requires your attention. The document has been indexed to the patient’s chart for your review.      Reason for sending: RECEIVED MEDICAL RECORDS.    Documents Description: LAB 06/12/24.> INDEXED IN CHART    Name of Sender: [facility] [phone]    Date Indexed: 06/18/24    Notes (if needed): THANKS!

## 2024-06-19 ENCOUNTER — OFFICE VISIT (OUTPATIENT)
Dept: ONCOLOGY | Facility: HOSPITAL | Age: 74
End: 2024-06-19
Payer: MEDICARE

## 2024-06-19 VITALS
HEIGHT: 62 IN | DIASTOLIC BLOOD PRESSURE: 70 MMHG | BODY MASS INDEX: 27.38 KG/M2 | OXYGEN SATURATION: 100 % | HEART RATE: 63 BPM | RESPIRATION RATE: 16 BRPM | WEIGHT: 148.8 LBS | TEMPERATURE: 98.3 F | SYSTOLIC BLOOD PRESSURE: 143 MMHG

## 2024-06-19 DIAGNOSIS — C91.10 CLL (CHRONIC LYMPHOCYTIC LEUKEMIA): Primary | ICD-10-CM

## 2024-06-19 PROCEDURE — G0463 HOSPITAL OUTPT CLINIC VISIT: HCPCS | Performed by: INTERNAL MEDICINE

## 2024-06-19 RX ORDER — ONDANSETRON 4 MG/1
TABLET, ORALLY DISINTEGRATING ORAL
COMMUNITY
Start: 2024-05-13

## 2024-06-19 RX ORDER — BUSPIRONE HYDROCHLORIDE 5 MG/1
TABLET ORAL
COMMUNITY
Start: 2024-05-22

## 2024-06-19 RX ORDER — OMEPRAZOLE 20 MG/1
1 CAPSULE, DELAYED RELEASE ORAL DAILY
COMMUNITY
Start: 2024-06-07

## 2024-06-19 NOTE — PROGRESS NOTES
Chief Complaint/Care Team   CLL (chronic lymphocytic leukemia)    Kimberly Jolly APRN Gilley, Misty G, SUZANNE    History of Present Illness     Diagnosis: CLL, diagnosed via flow cytometry of peripheral blood on 8/15/2023.    Current Treatment: Active surveillance    Previous Treatment: None    Treva Chan is a 74 y.o. female who presents to [facility] HEMATOLOGY & ONCOLOGY for evaluation and treatment for CLL.    PMH includes: kidney stones, anxiety, hypertension, carotid artery stenosis, HLD, osteoarthritis, PVC's, renal cell carcinoma, and prior stroke. She is a former smoker. Renal cell carcinoma with robot assisted lap right partial in May of 2018. All of her scans have been cancer free. She follows with Dr. Robbie Contreras.      Has had chronic leukocytosis on 3 prior lab draws. Unsure of length of chronicitity of the leukocytosis, but she does have absolute lymphocytosis consistently above 5000. She does report a recent 5 pound weight loss. She does have chronic fatigue. She denies any early satiety. Denies any left upper quadrant pain.      7/26/2023:  WBC 10.67 absolute lymphocyte count of 6800, absolute eosinophils of 400.   6/28/2023: WBC 13.62, absolute lymphocytosis of 8300.   6/16/2023: WBC 13.35, absolute lymphocytosis of 9200.     Patient underwent flow cytometric analysis of peripheral blood on 8/15/2023, which revealed CLL.    Pt underwent lab work at outside facility for surveillance of her CLL and here to discuss those results. Patient again denies any fever, recent infection, chills, night sweats, lymphadenopathy, or unintentional weight loss. Pt with kidney infection diagnosed at outside hospital, received antibiotics, reports also dehydration and losing weight during hospitalization, she has since recovered, weight has remained stable.  She also reports some musculoskeletal discomfort for which she uses [employer] which she reports helps and she continues to follow with  "her PCP regarding this.      Review of Systems   Constitutional:  Positive for fatigue. Negative for appetite change, diaphoresis, fever, unexpected weight gain and unexpected weight loss.   HENT:  Negative for hearing loss, mouth sores, sore throat, swollen glands, trouble swallowing and voice change.    Eyes:  Negative for blurred vision.   Respiratory:  Negative for cough, shortness of breath and wheezing.    Cardiovascular:  Negative for chest pain and palpitations.   Gastrointestinal:  Negative for abdominal pain, blood in stool, constipation, diarrhea, nausea and vomiting.   Endocrine: Negative for cold intolerance and heat intolerance.   Genitourinary:  Negative for difficulty urinating, dysuria, frequency, hematuria and urinary incontinence.   Musculoskeletal:  Negative for back pain and myalgias.   Skin:  Negative for rash, skin lesions and wound.   Neurological:  Negative for dizziness, seizures, weakness, numbness and headache.   Hematological:  Does not bruise/bleed easily.   Psychiatric/Behavioral:  Negative for depressed mood. The patient is not nervous/anxious.    All other systems reviewed and are negative.       Oncology/Hematology History    No history exists.       Objective     Vitals:    06/19/24 1117   BP: 143/70   Pulse: 63   Resp: 16   Temp: 98.3 °F (36.8 °C)   TempSrc: Temporal   SpO2: 100%   Weight: 67.5 kg (148 lb 12.8 oz)   Height: 157.5 cm (62.01\")   PainSc: 0-No pain         ECOG score: 0         PHQ-9 Total Score:         Physical Exam  Vitals reviewed. Exam conducted with a chaperone present.   Constitutional:       General: She is not in acute distress.     Appearance: Normal appearance.   HENT:      Head: Normocephalic and atraumatic.   Eyes:      Extraocular Movements: Extraocular movements intact.      Conjunctiva/sclera: Conjunctivae normal.   Pulmonary:      Effort: Pulmonary effort is normal.   Musculoskeletal:      Cervical back: Normal range of motion and neck supple. "   Skin:     General: Skin is warm and dry.      Findings: No bruising.   Neurological:      Mental Status: She is oriented to person, place, and time.           Past Medical History     Past Medical History:   Diagnosis Date    Acid reflux     Aneurysm     Cancer     Coronary artery disease     Dizziness     High cholesterol     Hypertension     Incontinence     Urinary tract infection      Current Outpatient Medications on File Prior to Visit   Medication Sig Dispense Refill    acetaminophen (TYLENOL) 500 MG tablet Every 8 (Eight) Hours.      amLODIPine-valsartan (EXFORGE) 5-160 MG per tablet amlodipine 5 mg-valsartan 160 mg tablet   TAKE ONE TABLET BY MOUTH EVERY DAY (TAKE WITH HCTZ)      aspirin 81 MG EC tablet Aspir-81 81 mg oral tablet,delayed release (DR/EC) take 1 tablet (81 mg) by oral route once daily   Active      atorvastatin (LIPITOR) 10 MG tablet atorvastatin 10 mg tablet   TAKE ONE TABLET BY MOUTH EVERY DAY      famotidine (PEPCID) 40 MG tablet famotidine 40 mg tablet   TAKE 1 TABLET BY MOUTH ONCE DAILY      meclizine (ANTIVERT) 25 MG tablet TAKE ONE TABLET BY MOUTH TWO TIMES PER DAY AS NEEDED      meloxicam (MOBIC) 7.5 MG tablet Take 1 tablet by mouth Daily.      metoprolol succinate XL (TOPROL-XL) 100 MG 24 hr tablet metoprolol succinate  mg tablet,extended release 24 hr   TAKE ONE TABLET BY MOUTH EVERY DAY      Mirabegron ER (Myrbetriq) 50 MG tablet sustained-release 24 hour 24 hr tablet Take 50 mg by mouth Daily. 56 tablet 0    busPIRone (BUSPAR) 5 MG tablet TAKE ONE TABLET BY MOUTH TWO TIMES PER DAY AS NEEDED (Patient not taking: Reported on 6/19/2024)      estradiol (ESTRACE) 0.1 MG/GM vaginal cream Apply a pea-sized amount inside the vagina and rub in and applied another pea-sized amount around the vestibule and massage and as well as around urethra.  3 times a week at night (Patient not taking: Reported on 6/19/2024) 42.5 g 6    Mirabegron ER (Myrbetriq) 50 MG tablet sustained-release 24  hour 24 hr tablet Take 50 mg by mouth Daily. (Patient not taking: Reported on 6/19/2024) 90 tablet 3    omeprazole (priLOSEC) 20 MG capsule Take 1 capsule by mouth Daily. (Patient not taking: Reported on 6/19/2024)      ondansetron ODT (ZOFRAN-ODT) 4 MG disintegrating tablet DISSOLVE 1 TABLET IN MOUTH EVERY 8 HOURS AS NEEDED FOR NAUSEA/VOMITING (Patient not taking: Reported on 6/19/2024)      sertraline (ZOLOFT) 50 MG tablet Take 1 tablet by mouth. (Patient not taking: Reported on 6/19/2024)       No current facility-administered medications on file prior to visit.      Allergies   Allergen Reactions    Levofloxacin Paresthesia and Unknown - Low Severity    Latex Rash and Unknown - Low Severity     Past Surgical History:   Procedure Laterality Date    COLONOSCOPY N/A     ENDOSCOPY N/A     GALLBLADDER SURGERY N/A     HERNIA REPAIR      NEPHRECTOMY PARTIAL Right      Social History     Socioeconomic History    Marital status:    Tobacco Use    Smoking status: Former     Types: Cigarettes     Passive exposure: Current    Smokeless tobacco: Never   Vaping Use    Vaping status: Never Used   Substance and Sexual Activity    Alcohol use: Never    Drug use: Never    Sexual activity: Defer     History reviewed. No pertinent family history.    Results     Result Review   The following data was reviewed by: Richie Castillo MD on 09/20/2023:  Lab Results   Component Value Date    HGB 13.0 08/15/2023    HCT 39.1 08/15/2023    MCV 89.5 08/15/2023     08/15/2023    WBC 12.79 (H) 08/15/2023    NEUTROABS 2.94 08/15/2023    EOSABS 0.77 (H) 08/15/2023     Lab Results   Component Value Date    GLUCOSE 84 08/15/2023    BUN 20 08/15/2023    CREATININE 1.69 (H) 08/15/2023     08/15/2023    K 4.4 08/15/2023     08/15/2023    CO2 27.3 08/15/2023    CALCIUM 9.0 08/15/2023    PROTEINTOT 6.8 08/15/2023    ALBUMIN 4.4 08/15/2023    BILITOT 0.4 08/15/2023    ALKPHOS 68 08/15/2023    AST 27 08/15/2023    ALT 14 08/15/2023  "    No results found for: \"MG\", \"PHOS\", \"FREET4\", \"TSH\"        No radiology results for the last day       Assessment & Plan     Diagnoses and all orders for this visit:    1. CLL (chronic lymphocytic leukemia) (Primary)  -     CBC & Differential; Future  -     Comprehensive Metabolic Panel; Future  -     Lactate Dehydrogenase; Future            Treva Chan is a 74 y.o. female who presents to [facility] HEMATOLOGY [facility] ONCOLOGY for discussion of treatment for CLL diagnosed 8/15/2023 after patient underwent peripheral blood flow cytometric analysis.    -Patient without any B symptoms, reviewed most recent labs from 6/12/2024, white blood cell count 17.1, hemoglobin 12.5, platelet count 211 K, no evidence of disease progression   - Discussed diagnosis, and prognosis of CLL with pt and  again today  - Discussed plan for close observation with repeat lab work to assess for progression of disease.  - Counseled patient on concerning B symptoms or lab changes that would warrant earlier evaluation with me in clinic.  -Recommend patient continue to follow with PCP regarding musculoskeletal discomfort    Plan for pt follow up in 6 months with CBC, CMP and LDH.    Patient and family verbalized understanding agreement with plan.    Please note that portions of this note were completed with a voice recognition program.    Electronically signed by Richie Castillo MD, 06/19/24, 1:03 PM EDT.      Follow Up     I spent 30 minutes caring for Treva on this date of service. This time includes time spent by me in the following activities:preparing for the visit, reviewing tests, obtaining and/or reviewing a separately obtained history, performing a medically appropriate examination and/or evaluation , counseling and educating the patient/family/caregiver, ordering medications, tests, or procedures, referring and communicating with other health care professionals , documenting information in the medical " record, independently interpreting results and communicating that information with the patient/family/caregiver, and care coordination.    This is an acute or chronic illness that poses a threat to life or bodily function. The above treatment plan involves a high risk of complications and/or mortality of patient management.    The patient was seen and examined. Work by the provider also included review and/or ordering of lab tests, review and/or ordering of radiology tests, review and/or ordering of medicine tests, discussion with other physicians or providers, independent review of data, obtaining old records, review/summation of old records, and/or other review.    I have reviewed the family history, social history, and past medical history for this patient. Previous information and data has been reviewed and updated as needed. I have reviewed and verified the chief complaint, history, and other documentation. The patient was interviewed and examined in the clinic and the chart reviewed. The previous observations, recommendations, and conclusions were reviewed including those of other providers.     The plan was discussed with the patient and/or family. The patient was given time to ask questions and these questions were answered. At the conclusion of their visit they had no additional questions or concerns and all questions were answered to their satisfaction.    Patient was given instructions and counseling regarding her condition or for health maintenance advice. Please see specific information pulled into the AVS if appropriate.

## 2024-09-03 ENCOUNTER — OFFICE VISIT (OUTPATIENT)
Dept: UROLOGY | Facility: CLINIC | Age: 74
End: 2024-09-03
Payer: MEDICARE

## 2024-09-03 VITALS
WEIGHT: 148 LBS | HEART RATE: 58 BPM | BODY MASS INDEX: 27.23 KG/M2 | DIASTOLIC BLOOD PRESSURE: 82 MMHG | HEIGHT: 62 IN | RESPIRATION RATE: 14 BRPM | SYSTOLIC BLOOD PRESSURE: 148 MMHG

## 2024-09-03 DIAGNOSIS — N39.0 URINARY TRACT INFECTION WITHOUT HEMATURIA, SITE UNSPECIFIED: ICD-10-CM

## 2024-09-03 DIAGNOSIS — Z85.528 HISTORY OF RENAL CELL CARCINOMA: Primary | ICD-10-CM

## 2024-09-03 DIAGNOSIS — N39.41 URGE INCONTINENCE: ICD-10-CM

## 2024-09-03 DIAGNOSIS — N39.3 STRESS INCONTINENCE: ICD-10-CM

## 2024-09-03 LAB
BILIRUB BLD-MCNC: NEGATIVE MG/DL
CLARITY, POC: CLEAR
COLOR UR: ABNORMAL
EXPIRATION DATE: ABNORMAL
GLUCOSE UR STRIP-MCNC: NEGATIVE MG/DL
KETONES UR QL: NEGATIVE
LEUKOCYTE EST, POC: NEGATIVE
Lab: ABNORMAL
NITRITE UR-MCNC: POSITIVE MG/ML
PH UR: 5 [PH] (ref 5–8)
PROT UR STRIP-MCNC: NEGATIVE MG/DL
RBC # UR STRIP: ABNORMAL /UL
SP GR UR: 1 (ref 1–1.03)
URINE VOLUME: 0
UROBILINOGEN UR QL: NORMAL

## 2024-09-03 RX ORDER — SULFAMETHOXAZOLE/TRIMETHOPRIM 800-160 MG
TABLET ORAL
COMMUNITY
Start: 2024-05-13 | End: 2024-09-03

## 2024-09-03 RX ORDER — NITROFURANTOIN 25; 75 MG/1; MG/1
CAPSULE ORAL
Qty: 6 CAPSULE | Refills: 0 | Status: SHIPPED | OUTPATIENT
Start: 2024-09-03

## 2024-09-03 NOTE — PROGRESS NOTES
"Chief Complaint: Urinary Incontinence (Pt here for follow up. Pt is taking Mybetriq.  Pt states she doesn;t feel like medication is helping.  Pt has not had an infection \"in quiet awhile.\"/)    Subjective         History of Present Illness  Treva Chan is a 74 y.o. female presents to [facility] to be seen for follow-up history of renal cell carcinoma and urge urinary incontinence.    At last visit we increased Myrbetriq dosage to see if this would help with her urinary symptoms.    She states that increasing the dosage has not helped at all.    She is still not using Estrace.    She reports no symptoms of UTIs since we saw her last.    Nocturia x 2 can be more if she drinks before bed.     She mostly drinks pepsi.    She states that she is leaking a lot during the day.     She feels like when she is getting up \"it just runs out\"     She does leak with cough laugh sneeze and can leak with urge.            Previous:    At last visit started her on myrbetriq she states she feels this is helping.    She states that she is not using estrace. States just doesn't use it.    nocturia x 3-5    No UTIs since we last saw her.     No UTI symptoms.    She states she is still leaking 4-5 x a day.     She is still wearing depends.    She states that she was not able to afford the myrbetriq however this was $35    She is drinking a little more water  cup of coffee and 1-2 pepsis a day.     She is still drinking pepsi late in the afternoon and evening.      Previous:    She states that she has seen her primary care provider several times for urinary icontinence that is worsening     She has had several rounds of antibiotics based on symptoms    She is on oxybutynin from her PCP     She is very bothered by her leakage.    She states no burning or pain.    All of her urine cultures form her PCP are mixed kaya.    CMP performed on 7/26/2023 reveals BUN of 14 creatinine 1.0 GFR 54.    At last visit she " was found to have a urinary tract infection.    The patient had complained that she had near constant urinary urgency frequency and dark-colored urine however she was drinking very little throughout the day and was only drinking caffeinated beverages.    Did recommend at last visit she increase her water intake to gain better control of her urinary symptoms.    She states that she was doing better after antibiotics but got worse again a month later.     She states she had leftover bactrim and took this for the last 2 days.     She states she feels as if she is with more urgency and frequency and feels as if she has a UTI every month.    She states her symptoms of a Uti are burning with urination and foul odor to the urine.    She has not increased her water intake at this time.    Previous:    Robot assist lap right partial nx in May 2018.    Given myrbetriq samples at last visit related to incontinence.      She is on oxybutynin from her PCP. She states this helps some but she is still having urgency and frequency.     She states a foul odor to urine.     Recently been dealing with UTIs.    Admitted to the hospital in 2/2023 for UTI.    She is drinking a cup of coffee 1-2 cokes a day and very little water.     She is having trouble with urge incontinence.  She is wearing 2 pads daily.    5/2023 creatinine 1.1 GFR 49 does not see nephrology     Patient states she had a chest x-ray however we do not have record of this for today's visit.     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     4/21 creatinine 1.2, GFR 44  4/21 chest x-ray - neg    1/20 CT abdomen/pelvis with and without no signs of metastatic or recurrent disease.  Previous partial nephrectomy site on the right kidney.    1/20 Cr  1.1, 51 GFR  1/18  CXR  neg    5/9/2018 lap right partial nephrectomy  Path:   clear cell RCC  G2 1.9 cm  margins negative  pT1a    Objective     Past Medical History:   Diagnosis Date    Acid reflux      Aneurysm     Cancer     Coronary artery disease     Dizziness     High cholesterol     Hypertension     Incontinence     Urinary tract infection        Past Surgical History:   Procedure Laterality Date    COLONOSCOPY N/A     ENDOSCOPY N/A     GALLBLADDER SURGERY N/A     HERNIA REPAIR      NEPHRECTOMY PARTIAL Right          Current Outpatient Medications:     acetaminophen (TYLENOL) 500 MG tablet, Every 8 (Eight) Hours., Disp: , Rfl:     amLODIPine-valsartan (EXFORGE) 5-160 MG per tablet, amlodipine 5 mg-valsartan 160 mg tablet  TAKE ONE TABLET BY MOUTH EVERY DAY (TAKE WITH HCTZ), Disp: , Rfl:     aspirin 81 MG EC tablet, Aspir-81 81 mg oral tablet,delayed release (DR/EC) take 1 tablet (81 mg) by oral route once daily   Active, Disp: , Rfl:     atorvastatin (LIPITOR) 10 MG tablet, atorvastatin 10 mg tablet  TAKE ONE TABLET BY MOUTH EVERY DAY, Disp: , Rfl:     famotidine (PEPCID) 40 MG tablet, famotidine 40 mg tablet  TAKE 1 TABLET BY MOUTH ONCE DAILY, Disp: , Rfl:     meclizine (ANTIVERT) 25 MG tablet, TAKE ONE TABLET BY MOUTH TWO TIMES PER DAY AS NEEDED, Disp: , Rfl:     meloxicam (MOBIC) 7.5 MG tablet, Take 1 tablet by mouth Daily., Disp: , Rfl:     metoprolol succinate XL (TOPROL-XL) 100 MG 24 hr tablet, metoprolol succinate  mg tablet,extended release 24 hr  TAKE ONE TABLET BY MOUTH EVERY DAY, Disp: , Rfl:     sertraline (ZOLOFT) 50 MG tablet, Take 1 tablet by mouth., Disp: , Rfl:     nitrofurantoin, macrocrystal-monohydrate, (MACROBID) 100 MG capsule, 1 capsule by mouth twice a day starting the day before urology test., Disp: 6 capsule, Rfl: 0    Allergies   Allergen Reactions    Levofloxacin Paresthesia and Unknown - Low Severity    Latex Rash and Unknown - Low Severity        History reviewed. No pertinent family history.    Social History     Socioeconomic History    Marital status:    Tobacco Use    Smoking status: Former     Types: Cigarettes     Passive exposure: Current    Smokeless tobacco:  "Never   Vaping Use    Vaping status: Never Used   Substance and Sexual Activity    Alcohol use: Never    Drug use: Never    Sexual activity: Defer       Vital Signs:   /82 (BP Location: Left arm, Patient Position: Sitting)   Pulse 58   Resp 14   Ht 157.5 cm (62.01\")   Wt 67.1 kg (148 lb)   BMI 27.06 kg/m²      Physical Exam     Result Review :   The following data was reviewed by: SUZANNE Garber on 9/3/2024:  Results for orders placed or performed in visit on 09/03/24   Bladder Scan   Result Value Ref Range    Urine Volume 0    POC Urinalysis Dipstick, Automated    Specimen: Urine   Result Value Ref Range    Color Dark Yellow Yellow, Straw, Dark Yellow, Jolie    Clarity, UA Clear Clear    Specific Gravity  1.000 (A) 1.005 - 1.030    pH, Urine 5.0 (A) 5.0 - 8.0    Leukocytes Negative Negative    Nitrite, UA Positive (A) Negative    Protein, POC Negative Negative mg/dL    Glucose, UA Negative Negative mg/dL    Ketones, UA Negative Negative    Urobilinogen, UA Normal Normal, 0.2 E.U./dL    Bilirubin Negative Negative    Blood, UA Trace (A) Negative    Lot Number [id number]     Expiration Date 2,025/7         Bladder Scan interpretation 09/03/2024    Estimation of residual urine via RaptrI 3000 Verathon Bladder Scan  MA/nurse performing:  marlene mendez Ma   Residual Urine: 0 ml  Indication: History of renal cell carcinoma    Urge incontinence    Urinary tract infection without hematuria, site unspecified    Stress incontinence   Position: Supine  Examination: Incremental scanning of the suprapubic area using 2.0 MHz transducer using copious amounts of acoustic gel.   Findings: An anechoic area was demonstrated which represented the bladder, with measurement of residual urine as noted. I inspected this myself. In that the residual urine was stable or insignificant, refer to plan for treatment and plan necessary at this time.             Procedures        Assessment and Plan    Diagnoses and all orders for this " visit:    1. History of renal cell carcinoma (Primary)  -     POC Urinalysis Dipstick, Automated  -     Bladder Scan  -     Cancel: XR Chest 2 View; Future  -     Cancel: Comprehensive Metabolic Panel; Future  -     Comprehensive Metabolic Panel; Future  -     XR Chest 2 View; Future    2. Urge incontinence  -     POC Urinalysis Dipstick, Automated  -     Bladder Scan  -     Cystometrogram; Future  -     nitrofurantoin, macrocrystal-monohydrate, (MACROBID) 100 MG capsule; 1 capsule by mouth twice a day starting the day before urology test.  Dispense: 6 capsule; Refill: 0    3. Urinary tract infection without hematuria, site unspecified  -     POC Urinalysis Dipstick, Automated  -     Bladder Scan  -     Pathnostics Guidance UTI -; Future    4. Stress incontinence  -     Cystometrogram; Future  -     nitrofurantoin, macrocrystal-monohydrate, (MACROBID) 100 MG capsule; 1 capsule by mouth twice a day starting the day before urology test.  Dispense: 6 capsule; Refill: 0      We will order uds to determine the etiology of her symptoms.     Prophylactic atbx sent for uti prevention given instrumentation rfor UDS.    Will order CXR for rcc f/u .    F/u after Uds.    Discussed once again that she needs to cut down on caffeine and increase water intake.    Did recommend estrace use as well.     discussed  syndrome of menopause is a chronic condition that can affect the genitals and urinary tract in females. It typically results from hormonal shifts during menopause, though there can be other causes. symptoms may include vaginal burning, vaginal itching or irritation, reduced vaginal lubrication  decreased elasticity of the vagina, impaired sexual function, pain during penetrative sex, pain during urination, greater sense of urgency and frequency in urination, increased risk of vaginal, urinary tract, and bladder infections. discussed that the best treatment for this condition is vaginal estrogen cream. discsussed that this  is a localized form of estrogen that is only absorbed to the vulva. vagina and urethra.  Blackbox warning discussed. Specific instructions provided regarding placement of cream at the urethra and urethral meatus 3 times weekly.     I spent 10 minutes caring for Treva on this date of service. This time includes time spent by me in the following activities:reviewing tests, obtaining and/or reviewing a separately obtained history, performing a medically appropriate examination and/or evaluation , counseling and educating the patient/family/caregiver, ordering medications, tests, or procedures, and documenting information in the medical record  Follow Up   Return for uds f/u 2 weeks after.  Patient was given instructions and counseling regarding her condition or for health maintenance advice. Please see specific information pulled into the AVS if appropriate.         This document has been electronically signed by SUZANNE Garber  September 3, 2024 11:03 EDT

## 2024-09-05 ENCOUNTER — DOCUMENTATION (OUTPATIENT)
Dept: UROLOGY | Facility: CLINIC | Age: 74
End: 2024-09-05
Payer: MEDICARE

## 2024-09-05 DIAGNOSIS — N39.0 URINARY TRACT INFECTION WITHOUT HEMATURIA, SITE UNSPECIFIED: Primary | ICD-10-CM

## 2024-09-05 RX ORDER — METRONIDAZOLE 7.5 MG/G
GEL VAGINAL
Qty: 1 EACH | Refills: 0 | Status: SHIPPED | OUTPATIENT
Start: 2024-09-05

## 2024-10-03 ENCOUNTER — PROCEDURE VISIT (OUTPATIENT)
Dept: UROLOGY | Facility: CLINIC | Age: 74
End: 2024-10-03
Payer: MEDICARE

## 2024-10-03 DIAGNOSIS — N39.3 STRESS INCONTINENCE: ICD-10-CM

## 2024-10-03 DIAGNOSIS — N39.41 URGE INCONTINENCE: ICD-10-CM

## 2024-10-03 PROCEDURE — 51797 INTRAABDOMINAL PRESSURE TEST: CPT | Performed by: NURSE PRACTITIONER

## 2024-10-03 PROCEDURE — 51784 ANAL/URINARY MUSCLE STUDY: CPT | Performed by: NURSE PRACTITIONER

## 2024-10-03 PROCEDURE — 51728 CYSTOMETROGRAM W/VP: CPT | Performed by: NURSE PRACTITIONER

## 2024-10-03 PROCEDURE — 51741 ELECTRO-UROFLOWMETRY FIRST: CPT | Performed by: NURSE PRACTITIONER

## 2024-10-11 ENCOUNTER — TELEPHONE (OUTPATIENT)
Dept: UROLOGY | Facility: CLINIC | Age: 74
End: 2024-10-11
Payer: MEDICARE

## 2024-10-11 NOTE — TELEPHONE ENCOUNTER
Spoke to tp to remind there to get labs and KUB done prior to her appt with Marimar on 10/17/24.  Pt is of understanding

## 2024-10-17 ENCOUNTER — OFFICE VISIT (OUTPATIENT)
Dept: UROLOGY | Facility: CLINIC | Age: 74
End: 2024-10-17
Payer: MEDICARE

## 2024-10-17 VITALS
BODY MASS INDEX: 28.82 KG/M2 | SYSTOLIC BLOOD PRESSURE: 167 MMHG | HEIGHT: 62 IN | RESPIRATION RATE: 12 BRPM | WEIGHT: 156.6 LBS | DIASTOLIC BLOOD PRESSURE: 81 MMHG | HEART RATE: 58 BPM

## 2024-10-17 DIAGNOSIS — N39.41 URGE INCONTINENCE: Primary | ICD-10-CM

## 2024-10-17 DIAGNOSIS — Z85.528 HISTORY OF RENAL CELL CARCINOMA: ICD-10-CM

## 2024-10-17 DIAGNOSIS — N39.3 STRESS INCONTINENCE: ICD-10-CM

## 2024-10-17 DIAGNOSIS — N39.0 URINARY TRACT INFECTION WITHOUT HEMATURIA, SITE UNSPECIFIED: ICD-10-CM

## 2024-10-17 LAB
BILIRUB BLD-MCNC: NEGATIVE MG/DL
CLARITY, POC: CLEAR
COLOR UR: YELLOW
EXPIRATION DATE: ABNORMAL
GLUCOSE UR STRIP-MCNC: NEGATIVE MG/DL
KETONES UR QL: NEGATIVE
LEUKOCYTE EST, POC: ABNORMAL
Lab: ABNORMAL
NITRITE UR-MCNC: NEGATIVE MG/ML
PH UR: 5.5 [PH] (ref 5–8)
PROT UR STRIP-MCNC: NEGATIVE MG/DL
RBC # UR STRIP: ABNORMAL /UL
SP GR UR: 1.03 (ref 1–1.03)
URINE VOLUME: 0
UROBILINOGEN UR QL: NORMAL

## 2024-10-17 RX ORDER — VIBEGRON 75 MG/1
75 TABLET, FILM COATED ORAL DAILY
Qty: 56 TABLET | Refills: 0 | COMMUNITY
Start: 2024-10-17

## 2024-10-17 NOTE — PROGRESS NOTES
"Chief Complaint: Follow-up (History of renal cell carcinoma. Urge incontinence.)    Subjective         Follow-up    Treva Chan is a 74 y.o. female presents to [facility] to be seen for follow-up history of renal cell carcinoma and urge urinary incontinence.      Patient underwent urodynamics testing on 10/3/2024.    UDS revealed point pressure 60 cm H2O and 98 mL bladder volume.  Detrusor overactivity with incontinence beginning at 14 mL.  She had no evidence of stress incontinence at 96 mL.  She had detrusor overactivity without incontinence beginning at 48 mL.  The patient had detrusor overactivity with incontinence beginning at 14 mL.  The patient had adequate bladder emptying with low rate flow positive detrusor pressure and with minimal abdominal straining    She is using estrace cream now.     She is still drinking pepsis but she has cut down on this.            Previous:   At last visit we increased Myrbetriq dosage to see if this would help with her urinary symptoms.    She states that increasing the dosage has not helped at all.    She is still not using Estrace.    She reports no symptoms of UTIs since we saw her last.    Nocturia x 2 can be more if she drinks before bed.     She mostly drinks pepsi.    She states that she is leaking a lot during the day.     She feels like when she is getting up \"it just runs out\"     She does leak with cough laugh sneeze and can leak with urge.            Previous:    At last visit started her on myrbetriq she states she feels this is helping.    She states that she is not using estrace. States just doesn't use it.    nocturia x 3-5    No UTIs since we last saw her.     No UTI symptoms.    She states she is still leaking 4-5 x a day.     She is still wearing depends.    She states that she was not able to afford the myrbetriq however this was $35    She is drinking a little more water  cup of coffee and 1-2 pepsis a day.     She is still " drinking pepsi late in the afternoon and evening.      Previous:    She states that she has seen her primary care provider several times for urinary icontinence that is worsening     She has had several rounds of antibiotics based on symptoms    She is on oxybutynin from her PCP     She is very bothered by her leakage.    She states no burning or pain.    All of her urine cultures form her PCP are mixed kaya.    CMP performed on 7/26/2023 reveals BUN of 14 creatinine 1.0 GFR 54.    At last visit she was found to have a urinary tract infection.    The patient had complained that she had near constant urinary urgency frequency and dark-colored urine however she was drinking very little throughout the day and was only drinking caffeinated beverages.    Did recommend at last visit she increase her water intake to gain better control of her urinary symptoms.    She states that she was doing better after antibiotics but got worse again a month later.     She states she had leftover bactrim and took this for the last 2 days.     She states she feels as if she is with more urgency and frequency and feels as if she has a UTI every month.    She states her symptoms of a Uti are burning with urination and foul odor to the urine.    She has not increased her water intake at this time.    Previous:    Robot assist lap right partial nx in May 2018.    Given myrbetriq samples at last visit related to incontinence.      She is on oxybutynin from her PCP. She states this helps some but she is still having urgency and frequency.     She states a foul odor to urine.     Recently been dealing with UTIs.    Admitted to the hospital in 2/2023 for UTI.    She is drinking a cup of coffee 1-2 cokes a day and very little water.     She is having trouble with urge incontinence.  She is wearing 2 pads daily.    5/2023 creatinine 1.1 GFR 49 does not see nephrology     Patient states she had a chest x-ray however we do not have record of this  for today's visit.     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     4/21 creatinine 1.2, GFR 44  4/21 chest x-ray - neg    1/20 CT abdomen/pelvis with and without no signs of metastatic or recurrent disease.  Previous partial nephrectomy site on the right kidney.    1/20 Cr  1.1, 51 GFR  1/18  CXR  neg    5/9/2018 lap right partial nephrectomy  Path:   clear cell RCC  G2 1.9 cm  margins negative  pT1a    Objective     Past Medical History:   Diagnosis Date    Acid reflux     Aneurysm     Cancer     Coronary artery disease     Dizziness     High cholesterol     Hypertension     Incontinence     Urinary tract infection        Past Surgical History:   Procedure Laterality Date    COLONOSCOPY N/A     ENDOSCOPY N/A     GALLBLADDER SURGERY N/A     HERNIA REPAIR      NEPHRECTOMY PARTIAL Right          Current Outpatient Medications:     acetaminophen (TYLENOL) 500 MG tablet, Every 8 (Eight) Hours., Disp: , Rfl:     amLODIPine-valsartan (EXFORGE) 5-160 MG per tablet, amlodipine 5 mg-valsartan 160 mg tablet  TAKE ONE TABLET BY MOUTH EVERY DAY (TAKE WITH HCTZ), Disp: , Rfl:     amoxicillin-clavulanate (AUGMENTIN) 875-125 MG per tablet, Take 1 tablet by mouth 2 (Two) Times a Day., Disp: 20 tablet, Rfl: 0    aspirin 81 MG EC tablet, Aspir-81 81 mg oral tablet,delayed release (DR/EC) take 1 tablet (81 mg) by oral route once daily   Active, Disp: , Rfl:     atorvastatin (LIPITOR) 10 MG tablet, atorvastatin 10 mg tablet  TAKE ONE TABLET BY MOUTH EVERY DAY, Disp: , Rfl:     famotidine (PEPCID) 40 MG tablet, famotidine 40 mg tablet  TAKE 1 TABLET BY MOUTH ONCE DAILY, Disp: , Rfl:     meclizine (ANTIVERT) 25 MG tablet, TAKE ONE TABLET BY MOUTH TWO TIMES PER DAY AS NEEDED, Disp: , Rfl:     meloxicam (MOBIC) 7.5 MG tablet, Take 1 tablet by mouth Daily., Disp: , Rfl:     metoprolol succinate XL (TOPROL-XL) 100 MG 24 hr tablet, metoprolol succinate  mg tablet,extended release 24 hr  TAKE ONE TABLET BY MOUTH  "EVERY DAY, Disp: , Rfl:     metroNIDAZOLE (METROGEL) 0.75 % vaginal gel, Insert 1 applicatorful into the vagina nightly for 7 days, Disp: 1 each, Rfl: 0    nitrofurantoin, macrocrystal-monohydrate, (MACROBID) 100 MG capsule, 1 capsule by mouth twice a day starting the day before urology test., Disp: 6 capsule, Rfl: 0    sertraline (ZOLOFT) 50 MG tablet, Take 1 tablet by mouth., Disp: , Rfl:     Vibegron (Gemtesa) 75 MG tablet, Take 1 tablet by mouth Daily., Disp: 56 tablet, Rfl: 0    Vibegron 75 MG tablet, Take 1 tablet by mouth Daily., Disp: 90 tablet, Rfl: 3    Allergies   Allergen Reactions    Levofloxacin Paresthesia and Unknown - Low Severity    Latex Rash and Unknown - Low Severity        History reviewed. No pertinent family history.    Social History     Socioeconomic History    Marital status:    Tobacco Use    Smoking status: Former     Types: Cigarettes     Passive exposure: Current    Smokeless tobacco: Never   Vaping Use    Vaping status: Never Used   Substance and Sexual Activity    Alcohol use: Never    Drug use: Never    Sexual activity: Defer       Vital Signs:   /81 (BP Location: Right arm, Patient Position: Sitting, Cuff Size: Adult)   Pulse 58   Resp 12   Ht 157.5 cm (62.01\")   Wt 71 kg (156 lb 9.6 oz)   BMI 28.63 kg/m²      Physical Exam     Result Review :   The following data was reviewed by: SUZANNE Garber on 10/17/2024:  Results for orders placed or performed in visit on 10/17/24   POC Urinalysis Dipstick, Automated    Collection Time: 10/17/24 10:08 AM    Specimen: Urine   Result Value Ref Range    Color Yellow Yellow, Straw, Dark Yellow, Jolie    Clarity, UA Clear Clear    Specific Gravity  1.030 1.005 - 1.030    pH, Urine 5.5 5.0 - 8.0    Leukocytes Trace (A) Negative    Nitrite, UA Negative Negative    Protein, POC Negative Negative mg/dL    Glucose, UA Negative Negative mg/dL    Ketones, UA Negative Negative    Urobilinogen, UA Normal Normal, 0.2 E.U./dL    " Bilirubin Negative Negative    Blood, UA Trace (A) Negative    Lot Number [id number]     Expiration Date 2,025/9    Bladder Scan    Collection Time: 10/17/24 10:09 AM   Result Value Ref Range    Urine Volume 0         Bladder Scan interpretation 09/03/2024    Estimation of residual urine via BVI 3000 Verathon Bladder Scan  MA/nurse performing:  marlene mendez Ma   Residual Urine: 0 ml  Indication: Urge incontinence    Stress incontinence    Urinary tract infection without hematuria, site unspecified    History of renal cell carcinoma   Position: Supine  Examination: Incremental scanning of the suprapubic area using 2.0 MHz transducer using copious amounts of acoustic gel.   Findings: An anechoic area was demonstrated which represented the bladder, with measurement of residual urine as noted. I inspected this myself. In that the residual urine was stable or insignificant, refer to plan for treatment and plan necessary at this time.             Procedures        Assessment and Plan    Diagnoses and all orders for this visit:    1. Urge incontinence (Primary)  -     POC Urinalysis Dipstick, Automated  -     Bladder Scan  -     Vibegron 75 MG tablet; Take 1 tablet by mouth Daily.  Dispense: 90 tablet; Refill: 3  -     Vibegron (Gemtesa) 75 MG tablet; Take 1 tablet by mouth Daily.  Dispense: 56 tablet; Refill: 0    2. Stress incontinence  -     POC Urinalysis Dipstick, Automated  -     Bladder Scan  -     Vibegron (Gemtesa) 75 MG tablet; Take 1 tablet by mouth Daily.  Dispense: 56 tablet; Refill: 0    3. Urinary tract infection without hematuria, site unspecified  -     POC Urinalysis Dipstick, Automated  -     Bladder Scan  -     Vibegron (Gemtesa) 75 MG tablet; Take 1 tablet by mouth Daily.  Dispense: 56 tablet; Refill: 0    4. History of renal cell carcinoma  -     POC Urinalysis Dipstick, Automated  -     Bladder Scan  -     Vibegron (Gemtesa) 75 MG tablet; Take 1 tablet by mouth Daily.  Dispense: 56 tablet; Refill:  0      She will continue vaginal estrogen cream.    Discussed options for possible PTNS versus for stage InterStim and bladder Botox as well as trying another medication.    Patient is agreeable to try Gemtesa to see if this will help to alleviate her urinary symptoms.          I spent 10 minutes caring for Treva on this date of service. This time includes time spent by me in the following activities:reviewing tests, obtaining and/or reviewing a separately obtained history, performing a medically appropriate examination and/or evaluation , counseling and educating the patient/family/caregiver, ordering medications, tests, or procedures, and documenting information in the medical record  Follow Up   Return in about 3 months (around 1/17/2025) for Follow-up incontinence with PVR.  Patient was given instructions and counseling regarding her condition or for health maintenance advice. Please see specific information pulled into the AVS if appropriate.         This document has been electronically signed by SUZANNE Garber  October 17, 2024 13:16 EDT

## 2024-12-19 ENCOUNTER — OFFICE VISIT (OUTPATIENT)
Dept: ONCOLOGY | Facility: HOSPITAL | Age: 74
End: 2024-12-19
Payer: MEDICARE

## 2024-12-19 VITALS
SYSTOLIC BLOOD PRESSURE: 153 MMHG | DIASTOLIC BLOOD PRESSURE: 75 MMHG | HEART RATE: 61 BPM | OXYGEN SATURATION: 98 % | TEMPERATURE: 97.6 F | HEIGHT: 62 IN | BODY MASS INDEX: 29.19 KG/M2 | WEIGHT: 158.6 LBS | RESPIRATION RATE: 16 BRPM

## 2024-12-19 DIAGNOSIS — C91.10 CLL (CHRONIC LYMPHOCYTIC LEUKEMIA): Primary | ICD-10-CM

## 2024-12-19 PROCEDURE — G0463 HOSPITAL OUTPT CLINIC VISIT: HCPCS | Performed by: INTERNAL MEDICINE

## 2024-12-19 NOTE — PROGRESS NOTES
Chief Complaint/Care Team   CLL (chronic lymphocytic leukemia)    Kimberly Jolly APRN Gilley, Misty G, SUZANNE    History of Present Illness     Diagnosis: CLL, diagnosed via flow cytometry of peripheral blood on 8/15/2023.    Current Treatment: Active surveillance    Previous Treatment: None    Treva Chan is a 74 y.o. female who presents to [facility] for evaluation and treatment for CLL.    PMH includes: kidney stones, anxiety, hypertension, carotid artery stenosis, HLD, osteoarthritis, PVC's, renal cell carcinoma, and prior stroke. She is a former smoker. Renal cell carcinoma with robot assisted lap right partial in May of 2018. All of her scans have been cancer free. She follows with Dr. Robbie Contreras.      Has had chronic leukocytosis on 3 prior lab draws. Unsure of length of chronicitity of the leukocytosis, but she does have absolute lymphocytosis consistently above 5000. She does report a recent 5 pound weight loss. She does have chronic fatigue. She denies any early satiety. Denies any left upper quadrant pain.      7/26/2023:  WBC 10.67 absolute lymphocyte count of 6800, absolute eosinophils of 400.   6/28/2023: WBC 13.62, absolute lymphocytosis of 8300.   6/16/2023: WBC 13.35, absolute lymphocytosis of 9200.     Patient underwent flow cytometric analysis of peripheral blood on 8/15/2023, which revealed CLL.    Pt underwent lab work at outside facility for surveillance of her CLL and here to discuss those results. No report of any fever, recent infection, chills, night sweats, lymphadenopathy, or unintentional weight loss.     Review of Systems   Constitutional:  Positive for fatigue. Negative for appetite change, diaphoresis, fever, unexpected weight gain and unexpected weight loss.   HENT:  Negative for hearing loss, mouth sores, sore throat, swollen glands, trouble swallowing and voice change.    Eyes:  Negative for blurred vision.   Respiratory:  Negative  "for cough, shortness of breath and wheezing.    Cardiovascular:  Negative for chest pain and palpitations.   Gastrointestinal:  Positive for constipation and diarrhea. Negative for abdominal pain, blood in stool, nausea and vomiting.   Endocrine: Negative for cold intolerance and heat intolerance.   Genitourinary:  Negative for difficulty urinating, dysuria, frequency, hematuria and urinary incontinence.   Musculoskeletal:  Negative for back pain and myalgias.   Skin:  Negative for rash, skin lesions and wound.   Neurological:  Negative for dizziness, seizures, weakness, numbness and headache.   Hematological:  Does not bruise/bleed easily.   Psychiatric/Behavioral:  Negative for depressed mood. The patient is not nervous/anxious.    All other systems reviewed and are negative.       Oncology/Hematology History    No history exists.       Objective     Vitals:    12/19/24 1148   BP: 153/75   Pulse: 61   Resp: 16   Temp: 97.6 °F (36.4 °C)   TempSrc: Temporal   SpO2: 98%   Weight: 71.9 kg (158 lb 9.6 oz)   Height: 157.5 cm (62.01\")   PainSc: 0-No pain           ECOG score: 0         PHQ-9 Total Score:         Physical Exam  Vitals reviewed. Exam conducted with a chaperone present.   Constitutional:       General: She is not in acute distress.     Appearance: Normal appearance.   HENT:      Head: Normocephalic and atraumatic.   Eyes:      Extraocular Movements: Extraocular movements intact.      Conjunctiva/sclera: Conjunctivae normal.   Pulmonary:      Effort: Pulmonary effort is normal.   Musculoskeletal:      Cervical back: Normal range of motion and neck supple.   Skin:     General: Skin is warm and dry.      Findings: No bruising.   Neurological:      Mental Status: She is oriented to person, place, and time.           Past Medical History     Past Medical History:   Diagnosis Date    Acid reflux     Aneurysm     Cancer     Coronary artery disease     Dizziness     High cholesterol     Hypertension     " Incontinence     Urinary tract infection      Current Outpatient Medications on File Prior to Visit   Medication Sig Dispense Refill    acetaminophen (TYLENOL) 500 MG tablet Every 8 (Eight) Hours.      amLODIPine-valsartan (EXFORGE) 5-160 MG per tablet amlodipine 5 mg-valsartan 160 mg tablet   TAKE ONE TABLET BY MOUTH EVERY DAY (TAKE WITH HCTZ)      aspirin 81 MG EC tablet Aspir-81 81 mg oral tablet,delayed release (DR/EC) take 1 tablet (81 mg) by oral route once daily   Active      atorvastatin (LIPITOR) 10 MG tablet atorvastatin 10 mg tablet   TAKE ONE TABLET BY MOUTH EVERY DAY      famotidine (PEPCID) 40 MG tablet famotidine 40 mg tablet   TAKE 1 TABLET BY MOUTH ONCE DAILY      meclizine (ANTIVERT) 25 MG tablet TAKE ONE TABLET BY MOUTH TWO TIMES PER DAY AS NEEDED      meloxicam (MOBIC) 7.5 MG tablet Take 1 tablet by mouth Daily.      metoprolol succinate XL (TOPROL-XL) 100 MG 24 hr tablet metoprolol succinate  mg tablet,extended release 24 hr   TAKE ONE TABLET BY MOUTH EVERY DAY      Vibegron (Gemtesa) 75 MG tablet Take 1 tablet by mouth Daily. 56 tablet 0    amoxicillin-clavulanate (AUGMENTIN) 875-125 MG per tablet Take 1 tablet by mouth 2 (Two) Times a Day. (Patient not taking: Reported on 12/19/2024) 20 tablet 0    metroNIDAZOLE (METROGEL) 0.75 % vaginal gel Insert 1 applicatorful into the vagina nightly for 7 days (Patient not taking: Reported on 12/19/2024) 1 each 0    nitrofurantoin, macrocrystal-monohydrate, (MACROBID) 100 MG capsule 1 capsule by mouth twice a day starting the day before urology test. (Patient not taking: Reported on 12/19/2024) 6 capsule 0    sertraline (ZOLOFT) 50 MG tablet Take 1 tablet by mouth. (Patient not taking: Reported on 12/19/2024)      Vibegron 75 MG tablet Take 1 tablet by mouth Daily. (Patient not taking: Reported on 12/19/2024) 90 tablet 3     No current facility-administered medications on file prior to visit.      Allergies   Allergen Reactions    Levofloxacin  "Paresthesia and Unknown - Low Severity    Latex Rash and Unknown - Low Severity     Past Surgical History:   Procedure Laterality Date    COLONOSCOPY N/A     ENDOSCOPY N/A     GALLBLADDER SURGERY N/A     HERNIA REPAIR      NEPHRECTOMY PARTIAL Right      Social History     Socioeconomic History    Marital status:    Tobacco Use    Smoking status: Former     Types: Cigarettes     Passive exposure: Current    Smokeless tobacco: Never   Vaping Use    Vaping status: Never Used   Substance and Sexual Activity    Alcohol use: Never    Drug use: Never    Sexual activity: Defer     History reviewed. No pertinent family history.    Results     Result Review   The following data was reviewed by: Richie Castillo MD on 09/20/2023:  Lab Results   Component Value Date    HGB 13.0 08/15/2023    HCT 39.1 08/15/2023    MCV 89.5 08/15/2023     08/15/2023    WBC 12.79 (H) 08/15/2023    NEUTROABS 2.94 08/15/2023    EOSABS 0.77 (H) 08/15/2023     Lab Results   Component Value Date    GLUCOSE 84 08/15/2023    BUN 20 08/15/2023    CREATININE 1.69 (H) 08/15/2023     08/15/2023    K 4.4 08/15/2023     08/15/2023    CO2 27.3 08/15/2023    CALCIUM 9.0 08/15/2023    PROTEINTOT 6.8 08/15/2023    ALBUMIN 4.4 08/15/2023    BILITOT 0.4 08/15/2023    ALKPHOS 68 08/15/2023    AST 27 08/15/2023    ALT 14 08/15/2023     No results found for: \"MG\", \"PHOS\", \"FREET4\", \"TSH\"        No radiology results for the last day       Assessment & Plan     Diagnoses and all orders for this visit:    1. CLL (chronic lymphocytic leukemia) (Primary)  -     CBC & Differential; Future  -     Comprehensive Metabolic Panel; Future  -     Lactate Dehydrogenase; Future    Other orders  -     LABS SCANNED  -     LABS SCANNED              Treva Chan is a 74 y.o. female who presents to [facility]AT[facility] for discussion of treatment for CLL diagnosed 8/15/2023 after patient underwent peripheral blood flow cytometric " analysis.    -Patient without any B symptoms, reviewed most recent labs from 12/11/2024, white blood cell count 15, hemoglobin 11.9, platelet count 193 K, no evidence of pt requiring treatment at this time  - Discussed diagnosis, and prognosis of CLL with pt and  again today  - Discussed plan for close observation with repeat lab work to assess for progression of disease.  - Counseled patient on concerning B symptoms or lab changes that would warrant earlier evaluation with me in clinic.  -Recommend patient continue to follow with PCP regarding musculoskeletal discomfort    Plan for pt follow up in 6 months with CBC, CMP and LDH.    Patient and family verbalized understanding agreement with plan.    Please note that portions of this note were completed with a voice recognition program.    Electronically signed by Richie Castillo MD, 12/19/24, 1:37 PM EST.      Follow Up     I spent 30 minutes caring for Treva on this date of service. This time includes time spent by me in the following activities:preparing for the visit, reviewing tests, obtaining and/or reviewing a separately obtained history, performing a medically appropriate examination and/or evaluation , counseling and educating the patient/family/caregiver, ordering medications, tests, or procedures, referring and communicating with other health care professionals , documenting information in the medical record, independently interpreting results and communicating that information with the patient/family/caregiver, and care coordination.    This is an acute or chronic illness that poses a threat to life or bodily function. The above treatment plan involves a high risk of complications and/or mortality of patient management.    The patient was seen and examined. Work by the provider also included review and/or ordering of lab tests, review and/or ordering of radiology tests, review and/or ordering of medicine tests, discussion with other physicians or  providers, independent review of data, obtaining old records, review/summation of old records, and/or other review.    I have reviewed the family history, social history, and past medical history for this patient. Previous information and data has been reviewed and updated as needed. I have reviewed and verified the chief complaint, history, and other documentation. The patient was interviewed and examined in the clinic and the chart reviewed. The previous observations, recommendations, and conclusions were reviewed including those of other providers.     The plan was discussed with the patient and/or family. The patient was given time to ask questions and these questions were answered. At the conclusion of their visit they had no additional questions or concerns and all questions were answered to their satisfaction.    Patient was given instructions and counseling regarding her condition or for health maintenance advice. Please see specific information pulled into the AVS if appropriate.

## 2025-03-20 ENCOUNTER — OFFICE VISIT (OUTPATIENT)
Dept: UROLOGY | Age: 75
End: 2025-03-20
Payer: MEDICARE

## 2025-03-20 VITALS — HEIGHT: 62 IN | WEIGHT: 158 LBS | RESPIRATION RATE: 18 BRPM | BODY MASS INDEX: 29.08 KG/M2

## 2025-03-20 DIAGNOSIS — N39.3 STRESS INCONTINENCE: ICD-10-CM

## 2025-03-20 DIAGNOSIS — N39.41 URGE INCONTINENCE: Primary | ICD-10-CM

## 2025-03-20 DIAGNOSIS — Z85.528 HISTORY OF RENAL CELL CARCINOMA: ICD-10-CM

## 2025-03-20 DIAGNOSIS — R35.1 NOCTURIA: ICD-10-CM

## 2025-03-20 DIAGNOSIS — R31.29 MICROHEMATURIA: ICD-10-CM

## 2025-03-20 LAB
BACTERIA UR QL AUTO: ABNORMAL /HPF
BILIRUB BLD-MCNC: NEGATIVE MG/DL
BILIRUB UR QL STRIP: NEGATIVE
CLARITY UR: CLEAR
CLARITY, POC: CLEAR
COLOR UR: YELLOW
COLOR UR: YELLOW
EXPIRATION DATE: ABNORMAL
GLUCOSE UR STRIP-MCNC: NEGATIVE MG/DL
GLUCOSE UR STRIP-MCNC: NEGATIVE MG/DL
HGB UR QL STRIP.AUTO: NEGATIVE
HYALINE CASTS UR QL AUTO: ABNORMAL /LPF
KETONES UR QL STRIP: NEGATIVE
KETONES UR QL: NEGATIVE
LEUKOCYTE EST, POC: ABNORMAL
LEUKOCYTE ESTERASE UR QL STRIP.AUTO: ABNORMAL
Lab: ABNORMAL
NITRITE UR QL STRIP: NEGATIVE
NITRITE UR-MCNC: NEGATIVE MG/ML
PH UR STRIP.AUTO: 5.5 [PH] (ref 5–8)
PH UR: 5.5 [PH] (ref 5–8)
PROT UR QL STRIP: NEGATIVE
PROT UR STRIP-MCNC: NEGATIVE MG/DL
RBC # UR STRIP: ABNORMAL /HPF
RBC # UR STRIP: ABNORMAL /UL
REF LAB TEST METHOD: ABNORMAL
SP GR UR STRIP: 1.02 (ref 1–1.03)
SP GR UR: 1.03 (ref 1–1.03)
SQUAMOUS #/AREA URNS HPF: ABNORMAL /HPF
URINE VOLUME: 0
UROBILINOGEN UR QL STRIP: ABNORMAL
UROBILINOGEN UR QL: ABNORMAL
WBC # UR STRIP: ABNORMAL /HPF

## 2025-03-20 PROCEDURE — 81001 URINALYSIS AUTO W/SCOPE: CPT | Performed by: NURSE PRACTITIONER

## 2025-03-20 RX ORDER — NITROFURANTOIN 25; 75 MG/1; MG/1
CAPSULE ORAL
Qty: 6 CAPSULE | Refills: 0 | Status: SHIPPED | OUTPATIENT
Start: 2025-03-20

## 2025-03-20 RX ORDER — ALBUTEROL SULFATE 90 UG/1
INHALANT RESPIRATORY (INHALATION)
COMMUNITY
Start: 2025-03-17

## 2025-03-20 RX ORDER — CLOBETASOL PROPIONATE 0.5 MG/G
CREAM TOPICAL
COMMUNITY
Start: 2024-12-11

## 2025-03-20 NOTE — PROGRESS NOTES
Chief Complaint: Follow-up (Urge incontinence. Patient states having frequency of urinatin. )    Subjective         Primary Care Follow-Up    Treva Chan is a 74 y.o. female presents to [facility] to be seen for follow-up history of renal cell carcinoma and urge urinary incontinence.    At last visit we started her on gemtesa.     She states she took the samples and she saw no change in symptoms.     She states she continues to have issues with UTIs.     She was treated a few weeks ago for a UTI.    She states symptoms of utis are odor.    She continues with leakage.    I have no cx from her PCP.    She is only using estrace at times.     She states she has more leakage at night.     Nighttime is her main issue.     She state she can hold her urine more during the day.    She leaks large volumes when getting up out of bed in the morning.    She states she has been tested for sleep apnea and told she did have it but does not wear a cpap.           Previous:     Patient underwent urodynamics testing on 10/3/2024.    UDS revealed point pressure 60 cm H2O and 98 mL bladder volume.  Detrusor overactivity with incontinence beginning at 14 mL.  She had no evidence of stress incontinence at 96 mL.  She had detrusor overactivity without incontinence beginning at 48 mL.  The patient had detrusor overactivity with incontinence beginning at 14 mL.  The patient had adequate bladder emptying with low rate flow positive detrusor pressure and with minimal abdominal straining    She is using estrace cream now.     She is still drinking pepsis but she has cut down on this.            Previous:   At last visit we increased Myrbetriq dosage to see if this would help with her urinary symptoms.    She states that increasing the dosage has not helped at all.    She is still not using Estrace.    She reports no symptoms of UTIs since we saw her last.    Nocturia x 2 can be more if she drinks before bed.  "    She mostly drinks pepsi.    She states that she is leaking a lot during the day.     She feels like when she is getting up \"it just runs out\"     She does leak with cough laugh sneeze and can leak with urge.            Previous:    At last visit started her on myrbetriq she states she feels this is helping.    She states that she is not using estrace. States just doesn't use it.    nocturia x 3-5    No UTIs since we last saw her.     No UTI symptoms.    She states she is still leaking 4-5 x a day.     She is still wearing depends.    She states that she was not able to afford the myrbetriq however this was $35    She is drinking a little more water  cup of coffee and 1-2 pepsis a day.     She is still drinking pepsi late in the afternoon and evening.      Previous:    She states that she has seen her primary care provider several times for urinary icontinence that is worsening     She has had several rounds of antibiotics based on symptoms    She is on oxybutynin from her PCP     She is very bothered by her leakage.    She states no burning or pain.    All of her urine cultures form her PCP are mixed kaya.    CMP performed on 7/26/2023 reveals BUN of 14 creatinine 1.0 GFR 54.    At last visit she was found to have a urinary tract infection.    The patient had complained that she had near constant urinary urgency frequency and dark-colored urine however she was drinking very little throughout the day and was only drinking caffeinated beverages.    Did recommend at last visit she increase her water intake to gain better control of her urinary symptoms.    She states that she was doing better after antibiotics but got worse again a month later.     She states she had leftover bactrim and took this for the last 2 days.     She states she feels as if she is with more urgency and frequency and feels as if she has a UTI every month.    She states her symptoms of a Uti are burning with urination and foul odor to the " urine.    She has not increased her water intake at this time.    Previous:    Robot assist lap right partial nx in May 2018.    Given myrbetriq samples at last visit related to incontinence.      She is on oxybutynin from her PCP. She states this helps some but she is still having urgency and frequency.     She states a foul odor to urine.     Recently been dealing with UTIs.    Admitted to the hospital in 2/2023 for UTI.    She is drinking a cup of coffee 1-2 cokes a day and very little water.     She is having trouble with urge incontinence.  She is wearing 2 pads daily.    5/2023 creatinine 1.1 GFR 49 does not see nephrology     Patient states she had a chest x-ray however we do not have record of this for today's visit.     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     4/21 creatinine 1.2, GFR 44  4/21 chest x-ray - neg    1/20 CT abdomen/pelvis with and without no signs of metastatic or recurrent disease.  Previous partial nephrectomy site on the right kidney.    1/20 Cr  1.1, 51 GFR  1/18  CXR  neg    5/9/2018 lap right partial nephrectomy  Path:   clear cell RCC  G2 1.9 cm  margins negative  pT1a    Objective     Past Medical History:   Diagnosis Date    Acid reflux     Aneurysm     Cancer     Coronary artery disease     Dizziness     High cholesterol     Hypertension     Incontinence     Urinary tract infection        Past Surgical History:   Procedure Laterality Date    COLONOSCOPY N/A     ENDOSCOPY N/A     GALLBLADDER SURGERY N/A     HERNIA REPAIR      NEPHRECTOMY PARTIAL Right          Current Outpatient Medications:     acetaminophen (TYLENOL) 500 MG tablet, Every 8 (Eight) Hours., Disp: , Rfl:     albuterol sulfate  (90 Base) MCG/ACT inhaler, INHALE 2 PUFFS INTO LUNGS FOUR TIMES A DAY AS NEEDED, Disp: , Rfl:     amLODIPine-valsartan (EXFORGE) 5-160 MG per tablet, amlodipine 5 mg-valsartan 160 mg tablet  TAKE ONE TABLET BY MOUTH EVERY DAY (TAKE WITH HCTZ), Disp: , Rfl:      "aspirin 81 MG EC tablet, Aspir-81 81 mg oral tablet,delayed release (DR/EC) take 1 tablet (81 mg) by oral route once daily   Active, Disp: , Rfl:     atorvastatin (LIPITOR) 10 MG tablet, atorvastatin 10 mg tablet  TAKE ONE TABLET BY MOUTH EVERY DAY, Disp: , Rfl:     clobetasol propionate (TEMOVATE) 0.05 % cream, APPLY A THIN LAYER TO AFFECTED AREAS TWO TIMES PER DAY, Disp: , Rfl:     famotidine (PEPCID) 40 MG tablet, famotidine 40 mg tablet  TAKE 1 TABLET BY MOUTH ONCE DAILY, Disp: , Rfl:     meclizine (ANTIVERT) 25 MG tablet, TAKE ONE TABLET BY MOUTH TWO TIMES PER DAY AS NEEDED, Disp: , Rfl:     meloxicam (MOBIC) 7.5 MG tablet, Take 1 tablet by mouth Daily., Disp: , Rfl:     metoprolol succinate XL (TOPROL-XL) 100 MG 24 hr tablet, metoprolol succinate  mg tablet,extended release 24 hr  TAKE ONE TABLET BY MOUTH EVERY DAY, Disp: , Rfl:     nitrofurantoin, macrocrystal-monohydrate, (MACROBID) 100 MG capsule, 1 capsule by mouth twice a day starting the day before cystoscopy., Disp: 6 capsule, Rfl: 0    Allergies   Allergen Reactions    Levofloxacin Paresthesia and Unknown - Low Severity    Latex Rash and Unknown - Low Severity        History reviewed. No pertinent family history.    Social History     Socioeconomic History    Marital status:    Tobacco Use    Smoking status: Former     Types: Cigarettes     Passive exposure: Current    Smokeless tobacco: Never   Vaping Use    Vaping status: Never Used   Substance and Sexual Activity    Alcohol use: Never    Drug use: Never    Sexual activity: Defer       Vital Signs:   Resp 18   Ht 157.5 cm (62.01\")   Wt 71.7 kg (158 lb)   BMI 28.89 kg/m²      Physical Exam     Result Review :   The following data was reviewed by: SUZANNE Garber on 03/20/2025:  Results for orders placed or performed in visit on 03/20/25   POC Urinalysis Dipstick, Automated    Collection Time: 03/20/25 10:10 AM    Specimen: Urine   Result Value Ref Range    Color Yellow Yellow, " Straw, Dark Yellow, Jolie    Clarity, UA Clear Clear    Specific Gravity  1.030 1.005 - 1.030    pH, Urine 5.5 5.0 - 8.0    Leukocytes Trace (A) Negative    Nitrite, UA Negative Negative    Protein, POC Negative Negative mg/dL    Glucose, UA Negative Negative mg/dL    Ketones, UA Negative Negative    Urobilinogen, UA 0.2 E.U./dL Normal, 0.2 E.U./dL    Bilirubin Negative Negative    Blood, UA Trace (A) Negative    Lot Number [id number]     Expiration Date 12/2,025    Bladder Scan    Collection Time: 03/20/25 10:10 AM   Result Value Ref Range    Urine Volume 0         Bladder Scan interpretation 03/20/2025    Estimation of residual urine via ScreenI 3000 NEHP Bladder Scan  MA/nurse performing:  patricia newell Rn   Residual Urine: 0 ml  Indication: Urge incontinence    Stress incontinence    Nocturia    History of renal cell carcinoma    Microhematuria   Position: Supine  Examination: Incremental scanning of the suprapubic area using 2.0 MHz transducer using copious amounts of acoustic gel.   Findings: An anechoic area was demonstrated which represented the bladder, with measurement of residual urine as noted. I inspected this myself. In that the residual urine was stable or insignificant, refer to plan for treatment and plan necessary at this time.             Procedures        Assessment and Plan    Diagnoses and all orders for this visit:    1. Urge incontinence (Primary)  -     POC Urinalysis Dipstick, Automated  -     Bladder Scan    2. Stress incontinence    3. Nocturia    4. History of renal cell carcinoma  -     Urinalysis With Microscopic - Urine, Clean Catch; Future  -     CT Abdomen Pelvis With & Without Contrast; Future  -     Cystoscopy; Future    5. Microhematuria  -     CT Abdomen Pelvis With & Without Contrast; Future  -     Cystoscopy; Future  -     nitrofurantoin, macrocrystal-monohydrate, (MACROBID) 100 MG capsule; 1 capsule by mouth twice a day starting the day before cystoscopy.  Dispense: 6 capsule;  Refill: 0      She will continue vaginal estrogen cream.    Discussed options for possible PTNS versus for stage InterStim and bladder Botox as well as trying another medication.    She is agreeable to trial ptns.     I did discuss options are limited at this time for her condition.    She is concerned about bladder cancer given her brother had bladder cancer. She would like to proceed with upper and lower tract workup. Will order ct scan and cysto to rule out etiology contributing to her symptoms.    Discussed I'm unsure if she is having UTIs or not given we have no records from her PCP. She states she is being told she has UTIs by her PCP base on UA results.    Nocturia is a common condition that is multifactorial in nature and can be difficult to treat, unlikely to completely irradicate, and management is dictated by patient motivation to improve and cope with symptoms.     Management and trial of medication as determined your bother.     Recommend improvement of sleep quality via good sleep hygiene techniques.  Recommend discussing further with PCP.     Behavioral modifications including fluid management, limiting fluids prior to sleep, and urinate immediately prior to sleep.   Stop drinking 2-3 hours prior to sleep.     Recommended that patient laying flat in the afternoon with feet above heart level to assist wtih mobilizing dependent edema which typically occurs while sleeping.       Consider discussing work-up for sleep apnea with PCP.  Discussed that studies have shown that with treatment of sleep apnea, nocuria can be significantly reduced.     Some nocturia can result from untreated sleep apnea.  This happens when the airway becomes blocked oxygen drops and causes changes in chest pressure.  This can cause the body to secrete chemicals that act up on the kidneys heart and blood vessels to increase urinary output.    Urinary tract infection-no evidence on urine dip of infection today.  Discussed with  patient that chronic recurrent UTI is a common condition that is multifactorial in nature, difficult to treat, unlikely to completely irradicate, and the specific cause for recurrent infections is often unknown.     Discussed that urine cultures are critically important for the diagnosis of recurrent urinary tract infection.  Discussed that some women with history of vaginal atrophy will have concomitant genitourinary syndrome of menopause, cystitis symptoms, without bacteriuria.     Discussed that one of the best treatment and prevention medications for recurrent urinary tract infection and  syndrome of menopause is vaginal estrogen cream.  She will continue this.     At this point, recommend the above and abx only when culture positive  Discussed the importance of antibiotic stewardship; the prevalence of resistant bacteria; and adverse effects of prolonged antibiotic treatment including yeast infections and possible C. difficile colitis; thus would recommend continued use of on-demand antibiotics per culture sensitivities.  Provided specimen cups, patient to notify office as UTI symptoms arise so culture may be obtained.  Patient to take Pyridium while awaiting culture result and aware that antibiotic will only be ordered if urine culture positive.     Discussed that routine screening UA for infection is not recommended as in postmenopausal women asymptomatic bacteruria is common and does not warrant treatment     Discussed that obtaining urine culture after treatment to assess for resolution of infection for UTI is not recommended unless a patient has persistent symptoms of UTI given prevalence of asymptomatic UTI.         I spent 18 minutes caring for Treva on this date of service. This time includes time spent by me in the following activities:reviewing tests, obtaining and/or reviewing a separately obtained history, performing a medically appropriate examination and/or evaluation , counseling and  educating the patient/family/caregiver, ordering medications, tests, or procedures, and documenting information in the medical record  Follow Up   Return in about 3 months (around 6/20/2025) for f/u utis/ oab/ cysto with alcaraz .  Patient was given instructions and counseling regarding her condition or for health maintenance advice. Please see specific information pulled into the AVS if appropriate.         This document has been electronically signed by SUZANNE Garber  March 20, 2025 10:39 EDT

## 2025-03-21 ENCOUNTER — RESULTS FOLLOW-UP (OUTPATIENT)
Dept: UROLOGY | Age: 75
End: 2025-03-21
Payer: MEDICARE

## 2025-03-21 NOTE — TELEPHONE ENCOUNTER
Attempted to LM  to let patient know that per Marimar her Urine did not show any red blood cells at this moment. HUB okay to relay.

## 2025-05-11 PROBLEM — R31.29 MICROHEMATURIA: Status: ACTIVE | Noted: 2025-05-11

## 2025-05-11 PROBLEM — Z85.528 HISTORY OF RENAL CELL CARCINOMA: Status: ACTIVE | Noted: 2025-05-11

## 2025-05-12 ENCOUNTER — TELEPHONE (OUTPATIENT)
Dept: UROLOGY | Age: 75
End: 2025-05-12
Payer: MEDICARE

## 2025-05-12 NOTE — TELEPHONE ENCOUNTER
Patient called and states she would like to reschedule her appt with Dr alcaraz for a later date in June. Patient would like a call back.

## 2025-06-17 ENCOUNTER — TELEPHONE (OUTPATIENT)
Dept: ONCOLOGY | Facility: HOSPITAL | Age: 75
End: 2025-06-17
Payer: MEDICARE

## 2025-06-17 NOTE — TELEPHONE ENCOUNTER
I attempted to reach patient.  Patient has no voicemail set up.  I also attempted to reach patients emergency contact, her , her also has no voicemail set up.    Patient has labs that need to be completed before her appointment with  on 6/19/25. There are labs that are uploaded under Media but the results are from February.  We will need more recent results please.  Orders are in the patients chart.     Please inform patient if she calls back.

## 2025-06-19 ENCOUNTER — OFFICE VISIT (OUTPATIENT)
Dept: ONCOLOGY | Facility: HOSPITAL | Age: 75
End: 2025-06-19
Payer: MEDICARE

## 2025-06-19 VITALS
HEIGHT: 62 IN | HEART RATE: 65 BPM | SYSTOLIC BLOOD PRESSURE: 134 MMHG | WEIGHT: 161 LBS | TEMPERATURE: 98.1 F | DIASTOLIC BLOOD PRESSURE: 75 MMHG | BODY MASS INDEX: 29.63 KG/M2 | RESPIRATION RATE: 18 BRPM | OXYGEN SATURATION: 97 %

## 2025-06-19 DIAGNOSIS — C91.10 CHRONIC LYMPHOCYTIC LEUKEMIA: Primary | ICD-10-CM

## 2025-06-19 DIAGNOSIS — L98.9 SCALP LESION: ICD-10-CM

## 2025-06-19 DIAGNOSIS — R53.83 OTHER FATIGUE: ICD-10-CM

## 2025-06-19 DIAGNOSIS — D64.9 ANEMIA, UNSPECIFIED TYPE: ICD-10-CM

## 2025-06-19 PROCEDURE — G0463 HOSPITAL OUTPT CLINIC VISIT: HCPCS | Performed by: INTERNAL MEDICINE

## 2025-06-19 NOTE — PROGRESS NOTES
Chief Complaint/Care Team   Pt here to follow up regarding CLL    Kimberly Jolly APRN Gilley, Misty G, SUZANNE    History of Present Illness     Diagnosis: CLL, diagnosed via flow cytometry of peripheral blood on 8/15/2023.    Current Treatment: Active surveillance    Previous Treatment: None    HPI  Treva hCan is a 75 y.o. female who presents to [facility] for evaluation and treatment for CLL.    PMH includes: kidney stones, anxiety, hypertension, carotid artery stenosis, HLD, osteoarthritis, PVC's, renal cell carcinoma, and prior stroke. She is a former smoker. Renal cell carcinoma with robot assisted lap right partial in May of 2018. All of her scans have been cancer free. She follows with Dr. Robbie Contreras.      Has had chronic leukocytosis on 3 prior lab draws. Unsure of length of chronicitity of the leukocytosis, but she does have absolute lymphocytosis consistently above 5000. She does report a recent 5 pound weight loss. She does have chronic fatigue. She denies any early satiety. Denies any left upper quadrant pain.      7/26/2023:  WBC 10.67 absolute lymphocyte count of 6800, absolute eosinophils of 400.   6/28/2023: WBC 13.62, absolute lymphocytosis of 8300.   6/16/2023: WBC 13.35, absolute lymphocytosis of 9200.     Patient underwent flow cytometric analysis of peripheral blood on 8/15/2023, which revealed CLL.    I reviewed medical history listed above.  Interval History: Pt underwent lab work at outside facility for surveillance of her CLL and here to discuss those results. She is again without report of any fever, recent infection, chills, night sweats, lymphadenopathy, or unintentional weight loss. Pt reports an area on her right scalp that she is concerned about she noticed it 3 months ago, reports a history of psoriasis.  Patient reports some fatigue but able to complete ADLs and IADLs.    Review of Systems   Constitutional:  Positive for fatigue.  "Negative for appetite change, diaphoresis, fever, unexpected weight gain and unexpected weight loss.   HENT:  Negative for hearing loss, mouth sores, sore throat, swollen glands, trouble swallowing and voice change.    Eyes:  Negative for blurred vision.   Respiratory:  Negative for cough, shortness of breath and wheezing.    Cardiovascular:  Negative for chest pain and palpitations.   Gastrointestinal:  Negative for abdominal pain, blood in stool, nausea and vomiting.   Endocrine: Negative for cold intolerance and heat intolerance.   Genitourinary:  Negative for difficulty urinating, dysuria, frequency, hematuria and urinary incontinence.   Musculoskeletal:  Negative for back pain and myalgias.   Skin:  Negative for rash, skin lesions and wound.   Neurological:  Negative for dizziness, seizures, weakness, numbness and headache.   Hematological:  Does not bruise/bleed easily.   Psychiatric/Behavioral:  Negative for depressed mood. The patient is not nervous/anxious.    All other systems reviewed and are negative.       Oncology/Hematology History    No history exists.       Objective     Vitals:    06/19/25 1309   BP: 134/75   Pulse: 65   Resp: 18   Temp: 98.1 °F (36.7 °C)   TempSrc: Oral   SpO2: 97%   Weight: 73 kg (161 lb)   Height: 157.5 cm (62.01\")   PainSc: 0-No pain       ECOG score: 0         PHQ-9 Total Score:         Physical Exam  Vitals reviewed. Exam conducted with a chaperone present.   Constitutional:       General: She is not in acute distress.     Appearance: Normal appearance.   HENT:      Head: Normocephalic and atraumatic.   Eyes:      Extraocular Movements: Extraocular movements intact.      Conjunctiva/sclera: Conjunctivae normal.   Pulmonary:      Effort: Pulmonary effort is normal.   Musculoskeletal:      Cervical back: Normal range of motion and neck supple.   Skin:     General: Skin is warm and dry.      Findings: No bruising.      Comments: Small round hyperpigmented lesion on right scalp " with minor scale   Neurological:      Mental Status: She is oriented to person, place, and time.           Past Medical History     Past Medical History:   Diagnosis Date    Acid reflux     Aneurysm     Cancer     Coronary artery disease     Dizziness     High cholesterol     Hypertension     Incontinence     Urinary tract infection      Current Outpatient Medications on File Prior to Visit   Medication Sig Dispense Refill    acetaminophen (TYLENOL) 500 MG tablet Every 8 (Eight) Hours.      albuterol sulfate  (90 Base) MCG/ACT inhaler INHALE 2 PUFFS INTO LUNGS FOUR TIMES A DAY AS NEEDED      amLODIPine-valsartan (EXFORGE) 5-160 MG per tablet amlodipine 5 mg-valsartan 160 mg tablet   TAKE ONE TABLET BY MOUTH EVERY DAY (TAKE WITH HCTZ)      aspirin 81 MG EC tablet Aspir-81 81 mg oral tablet,delayed release (DR/EC) take 1 tablet (81 mg) by oral route once daily   Active      atorvastatin (LIPITOR) 10 MG tablet atorvastatin 10 mg tablet   TAKE ONE TABLET BY MOUTH EVERY DAY      clobetasol propionate (TEMOVATE) 0.05 % cream APPLY A THIN LAYER TO AFFECTED AREAS TWO TIMES PER DAY      famotidine (PEPCID) 40 MG tablet famotidine 40 mg tablet   TAKE 1 TABLET BY MOUTH ONCE DAILY      meclizine (ANTIVERT) 25 MG tablet TAKE ONE TABLET BY MOUTH TWO TIMES PER DAY AS NEEDED      meloxicam (MOBIC) 7.5 MG tablet Take 1 tablet by mouth Daily.      metoprolol succinate XL (TOPROL-XL) 100 MG 24 hr tablet metoprolol succinate  mg tablet,extended release 24 hr   TAKE ONE TABLET BY MOUTH EVERY DAY      nitrofurantoin, macrocrystal-monohydrate, (MACROBID) 100 MG capsule 1 capsule by mouth twice a day starting the day before cystoscopy. (Patient not taking: Reported on 6/19/2025) 6 capsule 0     No current facility-administered medications on file prior to visit.      Allergies   Allergen Reactions    Levofloxacin Paresthesia and Unknown - Low Severity    Latex Rash and Unknown - Low Severity     Past Surgical History:  "  Procedure Laterality Date    COLONOSCOPY N/A     ENDOSCOPY N/A     GALLBLADDER SURGERY N/A     HERNIA REPAIR      NEPHRECTOMY PARTIAL Right      Social History     Socioeconomic History    Marital status:    Tobacco Use    Smoking status: Former     Types: Cigarettes     Passive exposure: Current    Smokeless tobacco: Never   Vaping Use    Vaping status: Never Used   Substance and Sexual Activity    Alcohol use: Never    Drug use: Never    Sexual activity: Defer     History reviewed. No pertinent family history.    Results     Result Review   The following data was reviewed by: Richie Castillo MD   Lab Results   Component Value Date    HGB 13.0 08/15/2023    HCT 39.1 08/15/2023    MCV 89.5 08/15/2023     08/15/2023    WBC 12.79 (H) 08/15/2023    NEUTROABS 2.94 08/15/2023    EOSABS 0.77 (H) 08/15/2023     Lab Results   Component Value Date    GLUCOSE 84 08/15/2023    BUN 20 08/15/2023    CREATININE 1.69 (H) 08/15/2023     08/15/2023    K 4.4 08/15/2023     08/15/2023    CO2 27.3 08/15/2023    CALCIUM 9.0 08/15/2023    PROTEINTOT 6.8 08/15/2023    ALBUMIN 4.4 08/15/2023    BILITOT 0.4 08/15/2023    ALKPHOS 68 08/15/2023    AST 27 08/15/2023    ALT 14 08/15/2023     No results found for: \"MG\", \"PHOS\", \"FREET4\", \"TSH\"        No radiology results for the last day       Assessment & Plan     Diagnoses and all orders for this visit:    1. Chronic lymphocytic leukemia [C91.10] (Primary)  -     CBC & Differential; Future  -     Comprehensive Metabolic Panel; Future  -     Lactate Dehydrogenase; Future    2. Scalp lesion  -     Ambulatory Referral to Dermatology; Future    3. Other fatigue  -     Iron Profile w/o Ferritin; Future  -     Ferritin; Future  -     Vitamin B12; Future  -     Folate; Future    4. Anemia, unspecified type  -     Iron Profile w/o Ferritin; Future  -     Ferritin; Future        Treva Chan is a 75 y.o. female who presents to [facility] HEMATOLOGY & " ONCOLOGY for discussion of treatment for CLL diagnosed 8/15/2023 after patient underwent peripheral blood flow cytometric analysis.    6/19/2025-Patient again without any B symptoms, reviewed most recent labs from [facility] on 6/16/2025, white blood cell count 13.7, hemoglobin 11, platelet count 172 K, B12 level of 989, no evidence of pt requiring treatment at this time  - Discussed diagnosis, and prognosis of CLL with pt and  again today  - Discussed plan for close observation with repeat lab work to assess for progression of disease.  - Counseled patient on concerning B symptoms or lab changes that would warrant earlier evaluation with me in clinic.  -Recommend patient continue to follow with PCP regarding musculoskeletal discomfort    Right scalp lesion  - On 6/19/2025 patient reports scalp lesion that has been present for 3 months, thus referred patient to dermatology    Plan for pt follow up in 6 months with CBC, CMP, iron profile, ferritin, B12, folate and LDH.    Patient and family verbalized understanding agreement with plan.    Please note that portions of this note were completed with a voice recognition program.    Electronically signed by Richie Castillo MD, 06/19/25, 1:42 PM EDT.      Follow Up     I spent 30 minutes caring for Treva on this date of service. This time includes time spent by me in the following activities:preparing for the visit, reviewing tests, obtaining and/or reviewing a separately obtained history, performing a medically appropriate examination and/or evaluation , counseling and educating the patient/family/caregiver, ordering medications, tests, or procedures, referring and communicating with other health care professionals , documenting information in the medical record, independently interpreting results and communicating that information with the patient/family/caregiver, and care coordination.    This is an acute or chronic illness that poses a  threat to life or bodily function. The above treatment plan involves a high risk of complications and/or mortality of patient management.    The patient was seen and examined. Work by the provider also included review and/or ordering of lab tests, review and/or ordering of radiology tests, review and/or ordering of medicine tests, discussion with other physicians or providers, independent review of data, obtaining old records, review/summation of old records, and/or other review.    I have reviewed the family history, social history, and past medical history for this patient. Previous information and data has been reviewed and updated as needed. I have reviewed and verified the chief complaint, history, and other documentation. The patient was interviewed and examined in the clinic and the chart reviewed. The previous observations, recommendations, and conclusions were reviewed including those of other providers.     The plan was discussed with the patient and/or family. The patient was given time to ask questions and these questions were answered. At the conclusion of their visit they had no additional questions or concerns and all questions were answered to their satisfaction.    Patient was given instructions and counseling regarding her condition or for health maintenance advice. Please see specific information pulled into the AVS if appropriate.

## 2025-06-28 NOTE — PROGRESS NOTES
Procedures       Urinalysis was checked today and was negative for signs of infection      Cytoscopy Procedure:     Procedure: Flexible cytoscope was passed per urethra into the bladder without difficulty after proper consent. The bladder was inspected in a systematic meridian fashion.       Moderate trabeculation on the posterior wall, patient did have a diverticulum on the right posterior wall about 2 cm sized with 1.5 cm deep    No signs of pathology or mucosal abnormality      There were no tumors, lesions, stones, or other abnormalities noted within the bladder. Of note, there was no increased vascularity as well. Both ureteral orifices were identified and were normal in appearance. The flexible cytoscope was removed. The patient tolerated the procedure well.             History of renal cell carcinoma  Urge incontinence        2025 Gemtesa - did not help  Myrbetriq-did not help  Oxybutynin in the past    4/25 1.2, GFR 53    Recurrent UTIs-symptoms are oder    9/24 guidance - E. coli/-pansensitive    Drinking caffeine    Can leak with coughing/laughing/sneezing      PVR    3/25   000       Urinary incontinence  Using Estrace cream occasionally    Nocturnal enuresis most bothersome    10/24 UDS - leak point pressure 60, 98 mm bladder volum , detrusor overactivity incontinence beginning at 14 mL.  No stress incontinence at night 6 mm.    1/20 CT abdomen/pelvis with and withoutno signs of metastatic or recurrent disease.  Previous partial nephrectomy site on the right kidney.    1/20 Cr  1.1, 51 GFR  1/18  CXR  neg    5/9/2018 lap right partial nephrectomy  Path:   clear cell RCC  G2 1.9 cm  margins negative  pT1a              History of renal cell carcinoma  Urge incontinence        Cystoscopy negative, patient given reassurance    CT is not yet done, will get his CT urology protocol scheduled today    Patient understands we are ruling out malignancy and she must follow-up    Follow-up with NP in a few  weeks

## 2025-06-30 ENCOUNTER — PROCEDURE VISIT (OUTPATIENT)
Dept: UROLOGY | Age: 75
End: 2025-06-30
Payer: MEDICARE

## 2025-06-30 DIAGNOSIS — N39.41 URGE INCONTINENCE: Primary | ICD-10-CM

## 2025-06-30 DIAGNOSIS — Z85.528 HISTORY OF RENAL CELL CARCINOMA: ICD-10-CM

## 2025-06-30 DIAGNOSIS — R31.29 MICROHEMATURIA: ICD-10-CM

## 2025-08-19 ENCOUNTER — OFFICE VISIT (OUTPATIENT)
Dept: UROLOGY | Age: 75
End: 2025-08-19
Payer: MEDICARE

## 2025-08-19 VITALS — BODY MASS INDEX: 29.63 KG/M2 | HEIGHT: 62 IN | RESPIRATION RATE: 16 BRPM | WEIGHT: 161 LBS

## 2025-08-19 DIAGNOSIS — N39.41 URGE INCONTINENCE: Primary | ICD-10-CM

## 2025-08-19 DIAGNOSIS — R35.1 NOCTURIA: ICD-10-CM

## 2025-08-19 DIAGNOSIS — Z85.528 HISTORY OF RENAL CELL CARCINOMA: ICD-10-CM

## 2025-08-19 DIAGNOSIS — N95.8 GENITOURINARY SYNDROME OF MENOPAUSE: ICD-10-CM

## 2025-08-19 PROBLEM — I63.9 STROKE: Status: RESOLVED | Noted: 2017-07-19 | Resolved: 2025-08-19

## 2025-08-19 PROBLEM — L57.0 ACTINIC KERATOSIS: Status: RESOLVED | Noted: 2020-05-13 | Resolved: 2025-08-19

## 2025-08-19 PROBLEM — R06.00 DYSPNEA: Status: RESOLVED | Noted: 2018-06-27 | Resolved: 2025-08-19

## 2025-08-19 PROBLEM — K86.2 CYST OF PANCREAS: Status: RESOLVED | Noted: 2020-01-07 | Resolved: 2025-08-19

## 2025-08-19 LAB
BILIRUB BLD-MCNC: NEGATIVE MG/DL
CLARITY, POC: CLEAR
COLOR UR: YELLOW
EXPIRATION DATE: ABNORMAL
GLUCOSE UR STRIP-MCNC: NEGATIVE MG/DL
KETONES UR QL: NEGATIVE
LEUKOCYTE EST, POC: ABNORMAL
Lab: ABNORMAL
NITRITE UR-MCNC: NEGATIVE MG/ML
PH UR: 5.5 [PH] (ref 5–8)
PROT UR STRIP-MCNC: NEGATIVE MG/DL
RBC # UR STRIP: ABNORMAL /UL
SP GR UR: 1.03 (ref 1–1.03)
URINE VOLUME: 0
UROBILINOGEN UR QL: ABNORMAL

## 2025-08-19 PROCEDURE — 99214 OFFICE O/P EST MOD 30 MIN: CPT | Performed by: NURSE PRACTITIONER

## 2025-08-19 PROCEDURE — 51798 US URINE CAPACITY MEASURE: CPT | Performed by: NURSE PRACTITIONER

## 2025-08-19 PROCEDURE — 1159F MED LIST DOCD IN RCRD: CPT | Performed by: NURSE PRACTITIONER

## 2025-08-19 PROCEDURE — G2211 COMPLEX E/M VISIT ADD ON: HCPCS | Performed by: NURSE PRACTITIONER

## 2025-08-19 PROCEDURE — 81003 URINALYSIS AUTO W/O SCOPE: CPT | Performed by: NURSE PRACTITIONER

## 2025-08-19 PROCEDURE — 1160F RVW MEDS BY RX/DR IN RCRD: CPT | Performed by: NURSE PRACTITIONER

## 2025-08-19 RX ORDER — ESTRADIOL 0.1 MG/G
CREAM VAGINAL
Qty: 42.5 G | Refills: 6 | Status: SHIPPED | OUTPATIENT
Start: 2025-08-19

## 2025-08-19 RX ORDER — OXYBUTYNIN CHLORIDE 5 MG/1
5 TABLET ORAL NIGHTLY
Qty: 90 TABLET | Refills: 3 | Status: SHIPPED | OUTPATIENT
Start: 2025-08-19